# Patient Record
Sex: FEMALE | HISPANIC OR LATINO | Employment: OTHER | URBAN - METROPOLITAN AREA
[De-identification: names, ages, dates, MRNs, and addresses within clinical notes are randomized per-mention and may not be internally consistent; named-entity substitution may affect disease eponyms.]

---

## 2024-05-10 ENCOUNTER — PRE-ADMISSION TESTING (OUTPATIENT)
Dept: PREADMISSION TESTING | Facility: HOSPITAL | Age: 84
End: 2024-05-10
Payer: MEDICARE

## 2024-05-10 VITALS — WEIGHT: 123.02 LBS | BODY MASS INDEX: 22.64 KG/M2 | HEIGHT: 62 IN

## 2024-05-10 LAB
ALBUMIN SERPL-MCNC: 4.2 G/DL (ref 3.5–5.2)
ALBUMIN/GLOB SERPL: 1.3 G/DL
ALP SERPL-CCNC: 65 U/L (ref 39–117)
ALT SERPL W P-5'-P-CCNC: 19 U/L (ref 1–33)
ANION GAP SERPL CALCULATED.3IONS-SCNC: 10 MMOL/L (ref 5–15)
APTT PPP: 30.4 SECONDS (ref 22–39)
AST SERPL-CCNC: 20 U/L (ref 1–32)
BASOPHILS # BLD AUTO: 0.03 10*3/MM3 (ref 0–0.2)
BASOPHILS NFR BLD AUTO: 0.4 % (ref 0–1.5)
BILIRUB SERPL-MCNC: 0.3 MG/DL (ref 0–1.2)
BUN SERPL-MCNC: 29 MG/DL (ref 8–23)
BUN/CREAT SERPL: 25.7 (ref 7–25)
CALCIUM SPEC-SCNC: 9.5 MG/DL (ref 8.6–10.5)
CHLORIDE SERPL-SCNC: 101 MMOL/L (ref 98–107)
CO2 SERPL-SCNC: 30 MMOL/L (ref 22–29)
CREAT SERPL-MCNC: 1.13 MG/DL (ref 0.57–1)
DEPRECATED RDW RBC AUTO: 43.8 FL (ref 37–54)
EGFRCR SERPLBLD CKD-EPI 2021: 48.7 ML/MIN/1.73
EOSINOPHIL # BLD AUTO: 0.08 10*3/MM3 (ref 0–0.4)
EOSINOPHIL NFR BLD AUTO: 1.1 % (ref 0.3–6.2)
ERYTHROCYTE [DISTWIDTH] IN BLOOD BY AUTOMATED COUNT: 13.2 % (ref 12.3–15.4)
GLOBULIN UR ELPH-MCNC: 3.3 GM/DL
GLUCOSE SERPL-MCNC: 89 MG/DL (ref 65–99)
HBA1C MFR BLD: 5.4 % (ref 4.8–5.6)
HCT VFR BLD AUTO: 38.2 % (ref 34–46.6)
HGB BLD-MCNC: 12.6 G/DL (ref 12–15.9)
IMM GRANULOCYTES # BLD AUTO: 0.05 10*3/MM3 (ref 0–0.05)
IMM GRANULOCYTES NFR BLD AUTO: 0.7 % (ref 0–0.5)
INR PPP: 1.04 (ref 0.89–1.12)
LYMPHOCYTES # BLD AUTO: 1.92 10*3/MM3 (ref 0.7–3.1)
LYMPHOCYTES NFR BLD AUTO: 27.4 % (ref 19.6–45.3)
MCH RBC QN AUTO: 29.9 PG (ref 26.6–33)
MCHC RBC AUTO-ENTMCNC: 33 G/DL (ref 31.5–35.7)
MCV RBC AUTO: 90.7 FL (ref 79–97)
MONOCYTES # BLD AUTO: 0.41 10*3/MM3 (ref 0.1–0.9)
MONOCYTES NFR BLD AUTO: 5.8 % (ref 5–12)
NEUTROPHILS NFR BLD AUTO: 4.53 10*3/MM3 (ref 1.7–7)
NEUTROPHILS NFR BLD AUTO: 64.6 % (ref 42.7–76)
NRBC BLD AUTO-RTO: 0 /100 WBC (ref 0–0.2)
PLATELET # BLD AUTO: 250 10*3/MM3 (ref 140–450)
PMV BLD AUTO: 9.9 FL (ref 6–12)
POTASSIUM SERPL-SCNC: 4.1 MMOL/L (ref 3.5–5.2)
PROT SERPL-MCNC: 7.5 G/DL (ref 6–8.5)
PROTHROMBIN TIME: 13.7 SECONDS (ref 12.2–14.5)
RBC # BLD AUTO: 4.21 10*6/MM3 (ref 3.77–5.28)
SODIUM SERPL-SCNC: 141 MMOL/L (ref 136–145)
WBC NRBC COR # BLD AUTO: 7.02 10*3/MM3 (ref 3.4–10.8)

## 2024-05-10 PROCEDURE — 36415 COLL VENOUS BLD VENIPUNCTURE: CPT

## 2024-05-10 PROCEDURE — 80053 COMPREHEN METABOLIC PANEL: CPT

## 2024-05-10 PROCEDURE — 83036 HEMOGLOBIN GLYCOSYLATED A1C: CPT

## 2024-05-10 PROCEDURE — 85730 THROMBOPLASTIN TIME PARTIAL: CPT

## 2024-05-10 PROCEDURE — 93005 ELECTROCARDIOGRAM TRACING: CPT

## 2024-05-10 PROCEDURE — 85025 COMPLETE CBC W/AUTO DIFF WBC: CPT

## 2024-05-10 PROCEDURE — 85610 PROTHROMBIN TIME: CPT

## 2024-05-10 RX ORDER — OMEPRAZOLE 20 MG/1
20 CAPSULE, DELAYED RELEASE ORAL DAILY
COMMUNITY

## 2024-05-10 RX ORDER — IBUPROFEN 200 MG
200 TABLET ORAL DAILY
COMMUNITY

## 2024-05-10 RX ORDER — ACETAMINOPHEN 500 MG
500 TABLET ORAL DAILY
COMMUNITY

## 2024-05-10 RX ORDER — FOSINOPRIL SODIUM 20 MG/1
20 TABLET ORAL DAILY
COMMUNITY

## 2024-05-10 RX ORDER — HYDROCHLOROTHIAZIDE 25 MG/1
25 TABLET ORAL DAILY
COMMUNITY

## 2024-05-10 NOTE — PAT
Per Anesthesia Request, patient instructed not to take their ACE/ARB medications on the AM of surgery.     Patient's surgeon called in a prescription for Benzol Peroxide 5% wash to [facility].  Patient instructed to  from [facility] pharmacy that was submitted electronically.  Verbal and written instructions given regarding proper use of the Benzoyl Peroxide wash were provided to patient and/or famlily during PAT visit. Patient/family also instructed to complete Benzol Peroxide checklist and return it to Pre-op on the day of surgery.  Patient and/or family verbalized understanding.      Additionally, reinforced with patient to acquire this prescription from the [facility] retail pharmacy before leaving the hospital after PAT visit due to the potential unavailability at local pharmacies.     Bactroban prescribed by physician before PAT visit.  Verified with patient that medication(s) were picked up from their pharmacy.  Written instructions given to patient during PAT visit.  Patient/family also instructed to complete skin prep checklist and return the checklist on the day of surgery to preoperative staff.  Patient/family verbalized understanding.     Patient instructed to drink 20 ounces of Gatorade or Gatorlyte (if diabetic) and it needs to be completed 1 hour (for Main OR patients) or 2 hours (scheduled  section & [facility] patients) before given arrival time for procedure (NO RED Gatorade and NO Gatorade Zero).    Patient verbalized understanding.     Patient viewed general PAT education video as instructed in their preoperative information received from their surgeon.  Patient stated the general PAT education video was viewed in its entirety and survey completed.  Copies of PAT general education handouts (Incentive Spirometry, Meds to Beds Program, Patient Belongings, Pre-op skin preparation instructions, Blood Glucose testing, Visitor policy, Surgery FAQ, Code H) distributed to patient if not printed.  Education related to the PAT pass and skin preparation for surgery (if applicable) completed in PAT as a reinforcement to PAT education video. Patient instructed to return PAT pass provided today as well as completed skin preparation sheet (if applicable) on the day of procedure.     Additionally if patient had not viewed video yet but intended to view it at home or in our waiting area, then referred them to the handout with QR code/link provided during PAT visit.  Instructed patient to complete survey after viewing the video in its entirety.  Encouraged patient/family to read PAT general education handouts thoroughly and notify PAT staff with any questions or concerns. Patient verbalized understanding of all information and priority content.

## 2024-05-12 LAB
QT INTERVAL: 448 MS
QTC INTERVAL: 432 MS

## 2024-05-23 ENCOUNTER — ANESTHESIA EVENT (OUTPATIENT)
Dept: PERIOP | Facility: HOSPITAL | Age: 84
End: 2024-05-23
Payer: MEDICARE

## 2024-05-23 RX ORDER — SODIUM CHLORIDE 0.9 % (FLUSH) 0.9 %
10 SYRINGE (ML) INJECTION AS NEEDED
Status: CANCELLED | OUTPATIENT
Start: 2024-05-23

## 2024-05-23 RX ORDER — FAMOTIDINE 10 MG/ML
20 INJECTION, SOLUTION INTRAVENOUS ONCE
Status: CANCELLED | OUTPATIENT
Start: 2024-05-23 | End: 2024-05-23

## 2024-05-23 RX ORDER — SODIUM CHLORIDE 9 MG/ML
40 INJECTION, SOLUTION INTRAVENOUS AS NEEDED
Status: CANCELLED | OUTPATIENT
Start: 2024-05-23

## 2024-05-24 ENCOUNTER — ANESTHESIA EVENT CONVERTED (OUTPATIENT)
Dept: ANESTHESIOLOGY | Facility: HOSPITAL | Age: 84
End: 2024-05-24
Payer: MEDICARE

## 2024-05-24 ENCOUNTER — HOSPITAL ENCOUNTER (OUTPATIENT)
Facility: HOSPITAL | Age: 84
Discharge: HOME | End: 2024-05-25
Attending: ORTHOPAEDIC SURGERY | Admitting: ORTHOPAEDIC SURGERY
Payer: MEDICARE

## 2024-05-24 ENCOUNTER — ANESTHESIA (OUTPATIENT)
Dept: PERIOP | Facility: HOSPITAL | Age: 84
End: 2024-05-24
Payer: MEDICARE

## 2024-05-24 ENCOUNTER — APPOINTMENT (OUTPATIENT)
Dept: GENERAL RADIOLOGY | Facility: HOSPITAL | Age: 84
End: 2024-05-24
Payer: MEDICARE

## 2024-05-24 DIAGNOSIS — Z96.611 STATUS POST REVERSE TOTAL REPLACEMENT OF RIGHT SHOULDER: Primary | ICD-10-CM

## 2024-05-24 PROBLEM — M12.811 ROTATOR CUFF ARTHROPATHY OF RIGHT SHOULDER: Status: ACTIVE | Noted: 2024-05-24

## 2024-05-24 PROBLEM — M75.101 ROTATOR CUFF TEAR ARTHROPATHY OF RIGHT SHOULDER: Status: ACTIVE | Noted: 2024-05-24

## 2024-05-24 PROBLEM — M12.811 ROTATOR CUFF TEAR ARTHROPATHY OF RIGHT SHOULDER: Status: ACTIVE | Noted: 2024-05-24

## 2024-05-24 LAB — POTASSIUM SERPL-SCNC: 3.5 MMOL/L (ref 3.5–5.2)

## 2024-05-24 PROCEDURE — 25010000002 ONDANSETRON PER 1 MG

## 2024-05-24 PROCEDURE — 25010000002 CEFAZOLIN PER 500 MG: Performed by: ORTHOPAEDIC SURGERY

## 2024-05-24 PROCEDURE — 63710000001 ACETAMINOPHEN EXTRA STRENGTH 500 MG TABLET: Performed by: INTERNAL MEDICINE

## 2024-05-24 PROCEDURE — 25010000002 ROPIVACAINE HCL-NACL 0.2-0.9 % SOLUTION: Performed by: ORTHOPAEDIC SURGERY

## 2024-05-24 PROCEDURE — 97530 THERAPEUTIC ACTIVITIES: CPT

## 2024-05-24 PROCEDURE — 84132 ASSAY OF SERUM POTASSIUM: CPT | Performed by: ANESTHESIOLOGY

## 2024-05-24 PROCEDURE — 25810000003 SODIUM CHLORIDE 0.9 % SOLUTION: Performed by: ORTHOPAEDIC SURGERY

## 2024-05-24 PROCEDURE — 73020 X-RAY EXAM OF SHOULDER: CPT

## 2024-05-24 PROCEDURE — 25810000003 SODIUM CHLORIDE 0.9 % SOLUTION 250 ML FLEX CONT

## 2024-05-24 PROCEDURE — 25010000002 VANCOMYCIN 1 G RECONSTITUTED SOLUTION: Performed by: ORTHOPAEDIC SURGERY

## 2024-05-24 PROCEDURE — 25010000002 PROPOFOL 10 MG/ML EMULSION

## 2024-05-24 PROCEDURE — A9270 NON-COVERED ITEM OR SERVICE: HCPCS | Performed by: INTERNAL MEDICINE

## 2024-05-24 PROCEDURE — 25010000002 PHENYLEPHRINE 10 MG/ML SOLUTION

## 2024-05-24 PROCEDURE — G0378 HOSPITAL OBSERVATION PER HR: HCPCS

## 2024-05-24 PROCEDURE — 25010000002 FENTANYL CITRATE (PF) 50 MCG/ML SOLUTION: Performed by: NURSE ANESTHETIST, CERTIFIED REGISTERED

## 2024-05-24 PROCEDURE — 25010000002 BUPIVACAINE (PF) 0.25 % SOLUTION: Performed by: NURSE ANESTHETIST, CERTIFIED REGISTERED

## 2024-05-24 PROCEDURE — 25010000002 SUGAMMADEX 500 MG/5ML SOLUTION

## 2024-05-24 PROCEDURE — 25010000002 DEXAMETHASONE PER 1 MG

## 2024-05-24 PROCEDURE — C1776 JOINT DEVICE (IMPLANTABLE): HCPCS | Performed by: ORTHOPAEDIC SURGERY

## 2024-05-24 PROCEDURE — L3670 SO ACRO/CLAV CAN WEB PRE OTS: HCPCS | Performed by: ORTHOPAEDIC SURGERY

## 2024-05-24 PROCEDURE — 97550 CAREGIVER TRAING 1ST 30 MIN: CPT

## 2024-05-24 PROCEDURE — 63710000001 LISINOPRIL 20 MG TABLET: Performed by: INTERNAL MEDICINE

## 2024-05-24 PROCEDURE — 97166 OT EVAL MOD COMPLEX 45 MIN: CPT

## 2024-05-24 PROCEDURE — 25010000002 ROPIVACAINE HCL-NACL 0.2-0.9 % SOLUTION: Performed by: NURSE ANESTHETIST, CERTIFIED REGISTERED

## 2024-05-24 PROCEDURE — 25010000002 PHENYLEPHRINE 10 MG/ML SOLUTION 1 ML VIAL

## 2024-05-24 DEVICE — SCRW AEQUALIS PERFORM PERIPH 5X30MM: Type: IMPLANTABLE DEVICE | Site: SHOULDER | Status: FUNCTIONAL

## 2024-05-24 DEVICE — POST SHDLR AEQUALIS PERFORM REV PRSS/FIT SHT 7MM: Type: IMPLANTABLE DEVICE | Site: SHOULDER | Status: FUNCTIONAL

## 2024-05-24 DEVICE — ABSORBABLE HEMOSTAT (OXIDIZED REGENERATED CELLULOSE)
Type: IMPLANTABLE DEVICE | Site: SHOULDER | Status: FUNCTIONAL
Brand: SURGICEL

## 2024-05-24 DEVICE — SCRW PERIPH 5X34MM: Type: IMPLANTABLE DEVICE | Site: SHOULDER | Status: FUNCTIONAL

## 2024-05-24 DEVICE — SCRW AEQUALIS PERFORM PERIPH 5X26MM: Type: IMPLANTABLE DEVICE | Site: SHOULDER | Status: FUNCTIONAL

## 2024-05-24 DEVICE — IMPLANTABLE DEVICE: Type: IMPLANTABLE DEVICE | Site: SHOULDER | Status: FUNCTIONAL

## 2024-05-24 DEVICE — BASEPLT SHLDR AEQUALIS 1/2 WEDGE AUG 35D 25MM: Type: IMPLANTABLE DEVICE | Site: SHOULDER | Status: FUNCTIONAL

## 2024-05-24 RX ORDER — PREGABALIN 75 MG/1
75 CAPSULE ORAL ONCE
Status: COMPLETED | OUTPATIENT
Start: 2024-05-24 | End: 2024-05-24

## 2024-05-24 RX ORDER — TRANEXAMIC ACID 10 MG/ML
1000 INJECTION, SOLUTION INTRAVENOUS ONCE
Status: COMPLETED | OUTPATIENT
Start: 2024-05-24 | End: 2024-05-24

## 2024-05-24 RX ORDER — NALOXONE HCL 0.4 MG/ML
0.1 VIAL (ML) INJECTION
Status: DISCONTINUED | OUTPATIENT
Start: 2024-05-24 | End: 2024-05-25 | Stop reason: HOSPADM

## 2024-05-24 RX ORDER — ACETAMINOPHEN 500 MG
1000 TABLET ORAL ONCE
Status: COMPLETED | OUTPATIENT
Start: 2024-05-24 | End: 2024-05-24

## 2024-05-24 RX ORDER — FENTANYL CITRATE 50 UG/ML
50 INJECTION, SOLUTION INTRAMUSCULAR; INTRAVENOUS
Status: DISCONTINUED | OUTPATIENT
Start: 2024-05-24 | End: 2024-05-24 | Stop reason: HOSPADM

## 2024-05-24 RX ORDER — HYDRALAZINE HYDROCHLORIDE 20 MG/ML
5 INJECTION INTRAMUSCULAR; INTRAVENOUS
Status: DISCONTINUED | OUTPATIENT
Start: 2024-05-24 | End: 2024-05-24 | Stop reason: HOSPADM

## 2024-05-24 RX ORDER — LIDOCAINE HYDROCHLORIDE 10 MG/ML
0.5 INJECTION, SOLUTION EPIDURAL; INFILTRATION; INTRACAUDAL; PERINEURAL ONCE AS NEEDED
Status: COMPLETED | OUTPATIENT
Start: 2024-05-24 | End: 2024-05-24

## 2024-05-24 RX ORDER — IPRATROPIUM BROMIDE AND ALBUTEROL SULFATE 2.5; .5 MG/3ML; MG/3ML
3 SOLUTION RESPIRATORY (INHALATION) ONCE AS NEEDED
Status: DISCONTINUED | OUTPATIENT
Start: 2024-05-24 | End: 2024-05-24 | Stop reason: HOSPADM

## 2024-05-24 RX ORDER — DEXAMETHASONE SODIUM PHOSPHATE 4 MG/ML
INJECTION, SOLUTION INTRA-ARTICULAR; INTRALESIONAL; INTRAMUSCULAR; INTRAVENOUS; SOFT TISSUE AS NEEDED
Status: DISCONTINUED | OUTPATIENT
Start: 2024-05-24 | End: 2024-05-24 | Stop reason: SURG

## 2024-05-24 RX ORDER — MIDAZOLAM HYDROCHLORIDE 1 MG/ML
0.5 INJECTION INTRAMUSCULAR; INTRAVENOUS
Status: DISCONTINUED | OUTPATIENT
Start: 2024-05-24 | End: 2024-05-24 | Stop reason: HOSPADM

## 2024-05-24 RX ORDER — SODIUM CHLORIDE, SODIUM LACTATE, POTASSIUM CHLORIDE, CALCIUM CHLORIDE 600; 310; 30; 20 MG/100ML; MG/100ML; MG/100ML; MG/100ML
9 INJECTION, SOLUTION INTRAVENOUS CONTINUOUS
Status: DISCONTINUED | OUTPATIENT
Start: 2024-05-24 | End: 2024-05-24

## 2024-05-24 RX ORDER — DROPERIDOL 2.5 MG/ML
0.62 INJECTION, SOLUTION INTRAMUSCULAR; INTRAVENOUS ONCE AS NEEDED
Status: DISCONTINUED | OUTPATIENT
Start: 2024-05-24 | End: 2024-05-24 | Stop reason: HOSPADM

## 2024-05-24 RX ORDER — SODIUM CHLORIDE 9 MG/ML
9 INJECTION, SOLUTION INTRAVENOUS CONTINUOUS PRN
Status: DISCONTINUED | OUTPATIENT
Start: 2024-05-24 | End: 2024-05-24

## 2024-05-24 RX ORDER — PHENYLEPHRINE HYDROCHLORIDE 10 MG/ML
INJECTION INTRAVENOUS AS NEEDED
Status: DISCONTINUED | OUTPATIENT
Start: 2024-05-24 | End: 2024-05-24 | Stop reason: SURG

## 2024-05-24 RX ORDER — ACETAMINOPHEN 500 MG
500 TABLET ORAL EVERY 24 HOURS
Status: DISCONTINUED | OUTPATIENT
Start: 2024-05-24 | End: 2024-05-25 | Stop reason: HOSPADM

## 2024-05-24 RX ORDER — BUPIVACAINE HYDROCHLORIDE 2.5 MG/ML
INJECTION, SOLUTION EPIDURAL; INFILTRATION; INTRACAUDAL
Status: COMPLETED | OUTPATIENT
Start: 2024-05-24 | End: 2024-05-24

## 2024-05-24 RX ORDER — ONDANSETRON 2 MG/ML
4 INJECTION INTRAMUSCULAR; INTRAVENOUS ONCE AS NEEDED
Status: DISCONTINUED | OUTPATIENT
Start: 2024-05-24 | End: 2024-05-24 | Stop reason: HOSPADM

## 2024-05-24 RX ORDER — LABETALOL HYDROCHLORIDE 5 MG/ML
5 INJECTION, SOLUTION INTRAVENOUS
Status: DISCONTINUED | OUTPATIENT
Start: 2024-05-24 | End: 2024-05-24 | Stop reason: HOSPADM

## 2024-05-24 RX ORDER — FAMOTIDINE 20 MG/1
20 TABLET, FILM COATED ORAL ONCE
Status: COMPLETED | OUTPATIENT
Start: 2024-05-24 | End: 2024-05-24

## 2024-05-24 RX ORDER — DROPERIDOL 2.5 MG/ML
0.62 INJECTION, SOLUTION INTRAMUSCULAR; INTRAVENOUS
Status: DISCONTINUED | OUTPATIENT
Start: 2024-05-24 | End: 2024-05-24 | Stop reason: HOSPADM

## 2024-05-24 RX ORDER — ONDANSETRON 4 MG/1
4 TABLET, ORALLY DISINTEGRATING ORAL EVERY 6 HOURS PRN
Status: DISCONTINUED | OUTPATIENT
Start: 2024-05-24 | End: 2024-05-25 | Stop reason: HOSPADM

## 2024-05-24 RX ORDER — LIDOCAINE HYDROCHLORIDE 10 MG/ML
INJECTION, SOLUTION EPIDURAL; INFILTRATION; INTRACAUDAL; PERINEURAL AS NEEDED
Status: DISCONTINUED | OUTPATIENT
Start: 2024-05-24 | End: 2024-05-24 | Stop reason: SURG

## 2024-05-24 RX ORDER — SODIUM CHLORIDE 0.9 % (FLUSH) 0.9 %
10 SYRINGE (ML) INJECTION EVERY 12 HOURS SCHEDULED
Status: DISCONTINUED | OUTPATIENT
Start: 2024-05-24 | End: 2024-05-24 | Stop reason: HOSPADM

## 2024-05-24 RX ORDER — SODIUM CHLORIDE 0.9 % (FLUSH) 0.9 %
3-10 SYRINGE (ML) INJECTION AS NEEDED
Status: DISCONTINUED | OUTPATIENT
Start: 2024-05-24 | End: 2024-05-24 | Stop reason: HOSPADM

## 2024-05-24 RX ORDER — SODIUM CHLORIDE 450 MG/100ML
50 INJECTION, SOLUTION INTRAVENOUS CONTINUOUS
Status: DISCONTINUED | OUTPATIENT
Start: 2024-05-24 | End: 2024-05-25 | Stop reason: HOSPADM

## 2024-05-24 RX ORDER — LISINOPRIL 20 MG/1
20 TABLET ORAL
Status: DISCONTINUED | OUTPATIENT
Start: 2024-05-24 | End: 2024-05-25 | Stop reason: HOSPADM

## 2024-05-24 RX ORDER — OXYCODONE HYDROCHLORIDE 5 MG/1
5 TABLET ORAL EVERY 4 HOURS PRN
Status: DISCONTINUED | OUTPATIENT
Start: 2024-05-24 | End: 2024-05-25 | Stop reason: HOSPADM

## 2024-05-24 RX ORDER — HYDROMORPHONE HYDROCHLORIDE 1 MG/ML
0.5 INJECTION, SOLUTION INTRAMUSCULAR; INTRAVENOUS; SUBCUTANEOUS
Status: DISCONTINUED | OUTPATIENT
Start: 2024-05-24 | End: 2024-05-24 | Stop reason: HOSPADM

## 2024-05-24 RX ORDER — PROPOFOL 10 MG/ML
VIAL (ML) INTRAVENOUS AS NEEDED
Status: DISCONTINUED | OUTPATIENT
Start: 2024-05-24 | End: 2024-05-24 | Stop reason: SURG

## 2024-05-24 RX ORDER — PANTOPRAZOLE SODIUM 40 MG/1
40 TABLET, DELAYED RELEASE ORAL
Status: DISCONTINUED | OUTPATIENT
Start: 2024-05-25 | End: 2024-05-25 | Stop reason: HOSPADM

## 2024-05-24 RX ORDER — VANCOMYCIN HYDROCHLORIDE 1 G/20ML
INJECTION, POWDER, LYOPHILIZED, FOR SOLUTION INTRAVENOUS AS NEEDED
Status: DISCONTINUED | OUTPATIENT
Start: 2024-05-24 | End: 2024-05-24 | Stop reason: HOSPADM

## 2024-05-24 RX ORDER — HYDROCODONE BITARTRATE AND ACETAMINOPHEN 5; 325 MG/1; MG/1
1 TABLET ORAL ONCE AS NEEDED
Status: DISCONTINUED | OUTPATIENT
Start: 2024-05-24 | End: 2024-05-24 | Stop reason: HOSPADM

## 2024-05-24 RX ORDER — ONDANSETRON 2 MG/ML
4 INJECTION INTRAMUSCULAR; INTRAVENOUS EVERY 6 HOURS PRN
Status: DISCONTINUED | OUTPATIENT
Start: 2024-05-24 | End: 2024-05-25 | Stop reason: HOSPADM

## 2024-05-24 RX ORDER — EPHEDRINE SULFATE 50 MG/ML
INJECTION INTRAVENOUS AS NEEDED
Status: DISCONTINUED | OUTPATIENT
Start: 2024-05-24 | End: 2024-05-24 | Stop reason: SURG

## 2024-05-24 RX ORDER — ONDANSETRON 2 MG/ML
INJECTION INTRAMUSCULAR; INTRAVENOUS AS NEEDED
Status: DISCONTINUED | OUTPATIENT
Start: 2024-05-24 | End: 2024-05-24 | Stop reason: SURG

## 2024-05-24 RX ORDER — ACETAMINOPHEN 325 MG/1
650 TABLET ORAL EVERY 4 HOURS PRN
Status: DISCONTINUED | OUTPATIENT
Start: 2024-05-24 | End: 2024-05-25 | Stop reason: HOSPADM

## 2024-05-24 RX ORDER — ROCURONIUM BROMIDE 10 MG/ML
INJECTION, SOLUTION INTRAVENOUS AS NEEDED
Status: DISCONTINUED | OUTPATIENT
Start: 2024-05-24 | End: 2024-05-24 | Stop reason: SURG

## 2024-05-24 RX ORDER — SODIUM CHLORIDE 0.9 % (FLUSH) 0.9 %
3 SYRINGE (ML) INJECTION EVERY 12 HOURS SCHEDULED
Status: DISCONTINUED | OUTPATIENT
Start: 2024-05-24 | End: 2024-05-24 | Stop reason: HOSPADM

## 2024-05-24 RX ORDER — TRANEXAMIC ACID 10 MG/ML
1000 INJECTION, SOLUTION INTRAVENOUS ONCE
Status: DISCONTINUED | OUTPATIENT
Start: 2024-05-24 | End: 2024-05-24 | Stop reason: HOSPADM

## 2024-05-24 RX ORDER — ROPIVACAINE HYDROCHLORIDE 2 MG/ML
INJECTION, SOLUTION EPIDURAL; INFILTRATION; PERINEURAL CONTINUOUS
Status: DISCONTINUED | OUTPATIENT
Start: 2024-05-24 | End: 2024-05-25 | Stop reason: HOSPADM

## 2024-05-24 RX ORDER — SODIUM CHLORIDE 9 MG/ML
40 INJECTION, SOLUTION INTRAVENOUS AS NEEDED
Status: DISCONTINUED | OUTPATIENT
Start: 2024-05-24 | End: 2024-05-24 | Stop reason: HOSPADM

## 2024-05-24 RX ORDER — FENTANYL CITRATE 50 UG/ML
INJECTION, SOLUTION INTRAMUSCULAR; INTRAVENOUS
Status: COMPLETED | OUTPATIENT
Start: 2024-05-24 | End: 2024-05-24

## 2024-05-24 RX ADMIN — TRANEXAMIC ACID 1000 MG: 10 INJECTION, SOLUTION INTRAVENOUS at 11:03

## 2024-05-24 RX ADMIN — PHENYLEPHRINE HYDROCHLORIDE 100 MCG: 10 INJECTION INTRAVENOUS at 10:58

## 2024-05-24 RX ADMIN — EPHEDRINE SULFATE 5 MG: 50 INJECTION INTRAVENOUS at 11:30

## 2024-05-24 RX ADMIN — EPHEDRINE SULFATE 5 MG: 50 INJECTION INTRAVENOUS at 11:49

## 2024-05-24 RX ADMIN — ROCURONIUM BROMIDE 50 MG: 10 INJECTION INTRAVENOUS at 10:49

## 2024-05-24 RX ADMIN — SODIUM CHLORIDE 2000 MG: 900 INJECTION INTRAVENOUS at 18:06

## 2024-05-24 RX ADMIN — FENTANYL CITRATE 50 MCG: 50 INJECTION, SOLUTION INTRAMUSCULAR; INTRAVENOUS at 09:35

## 2024-05-24 RX ADMIN — EPHEDRINE SULFATE 10 MG: 50 INJECTION INTRAVENOUS at 10:58

## 2024-05-24 RX ADMIN — LISINOPRIL 20 MG: 20 TABLET ORAL at 18:06

## 2024-05-24 RX ADMIN — BUPIVACAINE HYDROCHLORIDE 15 ML: 2.5 INJECTION, SOLUTION EPIDURAL; INFILTRATION; INTRACAUDAL at 09:35

## 2024-05-24 RX ADMIN — LIDOCAINE HYDROCHLORIDE 50 MG: 10 INJECTION, SOLUTION EPIDURAL; INFILTRATION; INTRACAUDAL; PERINEURAL at 10:49

## 2024-05-24 RX ADMIN — PHENYLEPHRINE HYDROCHLORIDE 20 MCG/KG/MIN: 10 INJECTION INTRAVENOUS at 11:09

## 2024-05-24 RX ADMIN — ONDANSETRON 4 MG: 2 INJECTION INTRAMUSCULAR; INTRAVENOUS at 12:03

## 2024-05-24 RX ADMIN — ACETAMINOPHEN 1000 MG: 500 TABLET ORAL at 09:01

## 2024-05-24 RX ADMIN — ROCURONIUM BROMIDE 15 MG: 10 INJECTION INTRAVENOUS at 11:25

## 2024-05-24 RX ADMIN — DEXAMETHASONE SODIUM PHOSPHATE 4 MG: 4 INJECTION INTRA-ARTICULAR; INTRALESIONAL; INTRAMUSCULAR; INTRAVENOUS; SOFT TISSUE at 10:56

## 2024-05-24 RX ADMIN — BUPIVACAINE HYDROCHLORIDE 20 ML: 2.5 INJECTION, SOLUTION EPIDURAL; INFILTRATION; INTRACAUDAL; PERINEURAL at 09:43

## 2024-05-24 RX ADMIN — SODIUM CHLORIDE 9 ML/HR: 9 INJECTION, SOLUTION INTRAVENOUS at 09:01

## 2024-05-24 RX ADMIN — Medication 1000 MG: at 12:59

## 2024-05-24 RX ADMIN — PROPOFOL 150 MG: 10 INJECTION, EMULSION INTRAVENOUS at 10:49

## 2024-05-24 RX ADMIN — FAMOTIDINE 20 MG: 20 TABLET, FILM COATED ORAL at 09:01

## 2024-05-24 RX ADMIN — LIDOCAINE HYDROCHLORIDE 0.5 ML: 10 INJECTION, SOLUTION EPIDURAL; INFILTRATION; INTRACAUDAL; PERINEURAL at 09:01

## 2024-05-24 RX ADMIN — ACETAMINOPHEN 500 MG: 500 TABLET ORAL at 18:05

## 2024-05-24 RX ADMIN — SODIUM CHLORIDE 2000 MG: 900 INJECTION INTRAVENOUS at 10:56

## 2024-05-24 RX ADMIN — SUGAMMADEX 150 MG: 100 INJECTION, SOLUTION INTRAVENOUS at 12:16

## 2024-05-24 RX ADMIN — FENTANYL CITRATE 100 MCG: 50 INJECTION, SOLUTION INTRAMUSCULAR; INTRAVENOUS at 10:49

## 2024-05-24 RX ADMIN — PREGABALIN 75 MG: 75 CAPSULE ORAL at 09:01

## 2024-05-24 RX ADMIN — PROPOFOL 50 MG: 10 INJECTION, EMULSION INTRAVENOUS at 12:18

## 2024-05-24 RX ADMIN — TRANEXAMIC ACID 1000 MG: 10 INJECTION, SOLUTION INTRAVENOUS at 12:02

## 2024-05-24 NOTE — ANESTHESIA PROCEDURE NOTES
Airway  Urgency: elective    Date/Time: 5/24/2024 10:52 AM  Airway not difficult    General Information and Staff    Patient location during procedure: OR  CRNA/CAA: Luciano Peres CRNA    Indications and Patient Condition  Indications for airway management: airway protection    Preoxygenated: yes  MILS not maintained throughout  Mask difficulty assessment: 2 - vent by mask + OA or adjuvant +/- NMBA    Final Airway Details  Final airway type: endotracheal airway      Successful airway: ETT  Cuffed: yes   Successful intubation technique: video laryngoscopy  Endotracheal tube insertion site: oral  Blade: Michaels  Blade size: 3  ETT size (mm): 7.0  Cormack-Lehane Classification: grade I - full view of glottis  Placement verified by: chest auscultation and capnometry   Cuff volume (mL): 8  Measured from: lips  ETT/EBT  to lips (cm): 21  Number of attempts at approach: 1  Assessment: lips, teeth, and gum same as pre-op and atraumatic intubation    Additional Comments  Negative epigastric sounds, Breath sound equal bilaterally with symmetric chest rise and fall

## 2024-05-24 NOTE — ANESTHESIA PREPROCEDURE EVALUATION
Anesthesia Evaluation     Patient summary reviewed and Nursing notes reviewed   history of anesthetic complications:  PONV               Airway   Mallampati: II  TM distance: >3 FB  Neck ROM: full  No difficulty expected  Dental - normal exam     Pulmonary - negative pulmonary ROS and normal exam   Cardiovascular - normal exam    (+) hypertension      Neuro/Psych- negative ROS  GI/Hepatic/Renal/Endo    (+) GERD    Musculoskeletal (-) negative ROS    Abdominal  - normal exam    Bowel sounds: normal.   Substance History - negative use     OB/GYN negative ob/gyn ROS         Other                      Anesthesia Plan    ASA 2     general with block     intravenous induction     Anesthetic plan, risks, benefits, and alternatives have been provided, discussed and informed consent has been obtained with: patient.    Plan discussed with CRNA.    CODE STATUS:

## 2024-05-24 NOTE — ANESTHESIA PROCEDURE NOTES
Peripheral Block      Patient reassessed immediately prior to procedure    Patient location during procedure: OR  Reason for block: at surgeon's request and post-op pain management  Performed by  CRNA/CAA: Scott Collazo CRNA  Preanesthetic Checklist  Completed: patient identified, IV checked, site marked, risks and benefits discussed, surgical consent, monitors and equipment checked, pre-op evaluation and timeout performed  Prep:  Pt Position: supine  Sterile barriers:cap, gloves, mask and washed/disinfected hands  Prep: ChloraPrep  Patient monitoring: blood pressure monitoring, continuous pulse oximetry and EKG  Procedure  Performed under: general  Guidance:ultrasound guided and landmark technique  Images:still images obtained, printed/placed on chart    Laterality:right  Block Type:PECS I and PECS II  Injection Technique:single-shot  Needle Type:short-bevel  Needle Gauge:20 G  Resistance on Injection: none    Medications Used: bupivacaine PF (MARCAINE) 0.25 % injection - Injection   20 mL - 5/24/2024 9:43:00 AM      Medications  Preservative Free Saline:10ml  Comment:Block Injection:  Total volume of LA divided between Right and Left sided blocks         Post Assessment  Injection Assessment: negative aspiration for heme, incremental injection and no paresthesia on injection  Patient Tolerance:comfortable throughout block  Complications:no  Additional Notes  Interpectoral-Pectoserratus Plane   A high-frequency linear transducer, with sterile cover, was placed medial to the coracoid process in the paramedian sagittal plane. The transducer was moved caudally to the 4th rib and rotated slightly to allow an in-plane needle trajectory from medial to lateral. Pectoralis Major Muscle (PMM), Pectoralis Minor Muscle (PmM), Thoracoacromial Artery, Ribs, and Pleura were identified under ultrasound. The insertion site was prepped in sterile fashion and then localized with 2-5 ml of 1% Lidocaine. Using ultrasound-guidance, a  "20-gauge B-Ballard 4\" Ultraplex 360 non-stimulating echogenic needle was advanced in plane until the tip of the needle was in the fascial plane between the PMM and PmM, lateral to the Thoracoacromial Artery. 1-3ml of preservative free normal saline was used to hydro-dissect the fascial planes. After the fascial plane was verified, 10ml local anesthetic (LA) was injected for Interpectoral fascial plane block. The needle was continued along the same path to the level of the 4th rib below PmM.  Initially preservative free normal saline was used to confirm needle position and then 20 ml of LA was injected for Pectoserratus fascial plane block. Aspiration every 5 ml to prevent intravascular injection. Injection was completed with negative aspiration of blood and negative intravascular injection. Injection pressures were normal with minimal resistance.             "

## 2024-05-24 NOTE — PLAN OF CARE
Goal Outcome Evaluation:      Pain is well controlled at this time; nerve block in place. Up with assist of one and gait belt; loss of balance noted when ambulating; gait unsteady (PT to see patient again in the AM).  Patient remains on room air, vital signs stable. No complaints/concerns voiced by patient at this time--patient is hoping to go home tomorrow.

## 2024-05-24 NOTE — OP NOTE
Operative Report Reverse Total Shoulder Arthroplasty     DATE OF OPERATION: 05/24/24     PREOPERATIVE DIAGNOSIS: right shoulder rotator cuff tear arthropathy      POSTOPERATIVE DIAGNOSES:  1. same     PROCEDURES PERFORMED:  1. right reverse total shoulder arthroplasty.          SURGEON: Spike Mohr MD           Assistant: Aparna Colorado PA  ** Please note the physician assistant was medically necessary to assist with positioning retraction, arm positioning, care of soft tissues and closure      ANESTHESIA: General plus block.       ESTIMATED BLOOD LOSS:150mL.       COMPLICATIONS: None.       DISPOSITION: Recovery room in stable condition.      IMPLANTS:  Tornier reverse total shoulder system  Humeral Stem: size 1 + long  Humeral Tray: as above  Polyethylene Liner: 33 +0, std  Baseplate: 25 half-wedge,  short post  Glenosphere: 33+3      INDICATIONS: This is a 71-year-old female with right shoulder pain and limited function and motion secondary to above diagnosis. They have failed conservative treatment and after a discussion of risks, benefits, and alternatives, wished to proceed with shoulder arthroplasty.     DESCRIPTION OF PROCEDURE: On the day of surgery, the patient identified the right shoulder as the correct operative extremity. This was initialed by the surgeon with the patients's acknowledgment. The patient underwent placement of an interscalene block and was taken to the operating room and placed in the supine position. Upon induction of adequate anesthesia, the patient was brought up to the beach chair position and the shoulder and upper extremity were prepped and draped in the usual sterile fashion. Timeout confirmed the correct patient and operative extremity as well as that antibiotics were on board. A standard deltopectoral approach to the shoulder was carried out. It was carried sharply through the skin and subcutaneous tissue. Medial and lateral flaps were developed over the deltopectoral fascia.  The cephalic vein was identified and mobilized laterally with the deltoid. The subdeltoid and subpectoral spaces were mobilized and a blunt retractor was placed deep to this. The clavipectoral fascia was opened on the lateral edge of the conjoined tendon and the retractor was moved deep to this. The leading edge of the pectoralis was released exposing the long head of the biceps. This was tenosynovitic and therefore tenotomized. The 3 sisters were identified and coagulated. A subscapularis tenotomy was performed and rotator interval was released to the glenoid exposing the humeral head. The inferior capsule was released directly off the humerus to allow greater than 90° of external rotation. The anatomic neck was exposed and the humeral head osteotomy was performed in approximately 20° of retroversion. The remainder of the osteophytes were removed. The canal was then entered, reamed, and broached. The final stem impacted in in approximately 20° of retroversion. A head protector was placed. The humerus was subluxed posteriorly. The glenoid exposed. Circumferential labral excision and capsular release were performed. A  mobilization of the subscapularis was carried out as well.  A centering hole was drilled. The glenoid was gently reamed and then the  central hole for the baseplate was drilled  glenoid baseplate inserted.  Screws were then placed through the baseplate  The glenosphere was then inserted and locked into place with a set screw.  The humerus was carefully subluxed back anteriorly. A liner tray and polyethylene were placed and trialing was carried out. The appropriate final sizes were chosen and locked into place.  The shoulder was then reduced.  This allowed nearly full passive range of motion with no instability. The joint was copiously irrigated with orthopedic irrigation mixed with Betadine after the final implants were assembled and locked into place.      vancomycin powder was placed in the wound        The deltopectoral interval was approximated with 0 Vicryl, the subcutaneous tissue with 2-0 Vicryl, and the skin with nylon. A sterile dressing was placed. Anesthesia was reversed and the patient was taken to the recovery room in stable condition. All instrument, needle, and sponge counts were correct.       Spike Mohr MD, MS

## 2024-05-24 NOTE — H&P
Pre-Op H&P  Angela Rico  [id number]  1940      Chief complaint: Right shoulder pain      Subjective:  Patient is a 83 y.o.female presents for scheduled surgery by Dr. Mohr. She anticipates a REVERSE TOTAL SHOULDER ARTHROPLASTY - RIGHT today.  She reports a 30-year history of worsening right shoulder pain.  She has limited range of motion and occasionally difficulty with  of her right hand.  She denies numbness or tingling.  Conservative treatments failed to provide lasting benefits.      Review of Systems:  Constitutional-- No fever, chills or sweats. No fatigue.  CV-- No chest pain, palpitation or syncope. +HTN  Resp-- No SOB, cough, hemoptysis  Skin--No rashes or lesions      Allergies:   Allergies   Allergen Reactions    Meloxicam Rash    Penicillins Itching and Rash         Home Meds:  Medications Prior to Admission   Medication Sig Dispense Refill Last Dose    acetaminophen (TYLENOL) 500 MG tablet Take 1 tablet by mouth Daily. Taking 1-2 times a day   5/23/2024    benzoyl peroxide 5 % external wash Use as directed by Dr. Mohr 142 g 0 5/24/2024    fosinopril (MONOPRIL) 20 MG tablet Take 1 tablet by mouth Daily.   5/23/2024    hydroCHLOROthiazide 25 MG tablet Take 1 tablet by mouth Daily.   5/23/2024    mupirocin (BACTROBAN) 2 % ointment Apply 1 application in each nostril 2 times daily beginning 5 days before surgery. 22 g 0 5/23/2024    omeprazole (priLOSEC) 20 MG capsule Take 1 capsule by mouth Daily.   5/24/2024 at 0400    ibuprofen (ADVIL,MOTRIN) 200 MG tablet Take 1 tablet by mouth Daily.   5/13/2024    ondansetron (ZOFRAN) 4 MG tablet Take 1 tablet by mouth Every 8 (Eight) Hours As Needed for post-op nausea. 30 tablet 0          PMH:   Past Medical History:   Diagnosis Date    Arthritis     GERD (gastroesophageal reflux disease)     Hypertension     PONV (postoperative nausea and vomiting)      PSH:    Past Surgical History:   Procedure Laterality Date    CATARACT EXTRACTION W/  INTRAOCULAR LENS IMPLANT Bilateral     COLONOSCOPY      ENDOSCOPY      TUBAL ABDOMINAL LIGATION      WRIST SURGERY Left        Immunization History:  Influenza: UTD  Pneumococcal: UTD  Tetanus: UTD  Covid : x3    Social History:   Tobacco:   Social History     Tobacco Use   Smoking Status Never   Smokeless Tobacco Never      Alcohol:     Social History     Substance and Sexual Activity   Alcohol Use Never         Physical Exam:/81 (BP Location: Right arm, Patient Position: Lying)   Pulse 58   Temp 98.5 °F (36.9 °C)   Resp 18   SpO2 97%       General Appearance:    Alert, cooperative, no distress, appears stated age   Head:    Normocephalic, without obvious abnormality, atraumatic   Lungs:     Clear to auscultation bilaterally, respirations unlabored    Heart:   Regular rate and rhythm, S1 and S2 normal    Abdomen:    Soft without tenderness   Extremities:   Extremities normal, atraumatic, no cyanosis or edema   Skin:   Skin color, texture, turgor normal, no rashes or lesions   Neurologic:   Grossly intact     Results Review:     LABS:  Lab Results   Component Value Date    WBC 7.02 05/10/2024    HGB 12.6 05/10/2024    HCT 38.2 05/10/2024    MCV 90.7 05/10/2024     05/10/2024    NEUTROABS 4.53 05/10/2024    GLUCOSE 89 05/10/2024    BUN 29 (H) 05/10/2024    CREATININE 1.13 (H) 05/10/2024     05/10/2024    K 4.1 05/10/2024     05/10/2024    CO2 30.0 (H) 05/10/2024    CALCIUM 9.5 05/10/2024    ALBUMIN 4.2 05/10/2024    AST 20 05/10/2024    ALT 19 05/10/2024    BILITOT 0.3 05/10/2024       RADIOLOGY:  Imaging Results (Last 72 Hours)       ** No results found for the last 72 hours. **            I reviewed the patient's new clinical results.    Cancer Staging (if applicable)  Cancer Patient: __ yes __no __unknown; If yes, clinical stage T:__ N:__M:__, stage group or __N/A      Impression: Right shoulder pain      Plan: REVERSE TOTAL SHOULDER ARTHROPLASTY - RIGHT      TOMÁS Quezada    5/24/2024   09:06 EDT

## 2024-05-24 NOTE — ANESTHESIA POSTPROCEDURE EVALUATION
Patient: Angela Rico    Procedure Summary       Date: 05/24/24 Room / Location:  [facility] /  [facility]    Anesthesia Start: 1042 Anesthesia Stop: 1245    Procedure: REVERSE TOTAL SHOULDER ARTHROPLASTY - RIGHT (Right: Shoulder) Diagnosis:     Surgeons: Spike Mohr MD Provider: Ramon Bird MD    Anesthesia Type: general with block ASA Status: 2            Anesthesia Type: general with block    Vitals  Vitals Value Taken Time   /73 05/24/24 1245   Temp 97 °F (36.1 °C) 05/24/24 1245   Pulse 82 05/24/24 1245   Resp 14 05/24/24 1245   SpO2 99 % 05/24/24 1245           Post Anesthesia Care and Evaluation    Patient location during evaluation: PACU  Patient participation: complete - patient participated  Level of consciousness: awake and alert  Pain score: 0  Pain management: adequate    Airway patency: patent  Anesthetic complications: No anesthetic complications  PONV Status: none  Cardiovascular status: hemodynamically stable and acceptable  Respiratory status: nonlabored ventilation, acceptable and nasal cannula  Hydration status: acceptable    Comments: Pt arrived to PACU with no issues during transport. Pt placed directly to monitors. Vital signs are within parameters. Pt maintaining ventilation spontaneously. No changes to dental. Report given to PACU and all question and concerns were addressed as well as the plan of care.

## 2024-05-24 NOTE — ANESTHESIA PROCEDURE NOTES
Peripheral Block      Patient reassessed immediately prior to procedure    Patient location during procedure: pre-op  Reason for block: at surgeon's request and post-op pain management  Performed by  CRNA/CAA: Scott Collazo CRNA  Preanesthetic Checklist  Completed: patient identified, IV checked, site marked, risks and benefits discussed, surgical consent, monitors and equipment checked, pre-op evaluation and timeout performed  Prep:  Sterile barriers:cap, gloves, mask and washed/disinfected hands  Prep: ChloraPrep  Patient monitoring: blood pressure monitoring, continuous pulse oximetry and EKG  Procedure    Sedation: yes  Performed under: local infiltration  Guidance:ultrasound guided    ULTRASOUND INTERPRETATION.  Using ultrasound guidance a 20 G gauge needle was placed in close proximity to the nerve, at which point, under ultrasound guidance anesthetic was injected in the area of the nerve and spread of the anesthesia was seen on ultrasound in close proximity thereto.  There were no abnormalities seen on ultrasound; a digital image was taken; and the patient tolerated the procedure with no complications. Images:still images obtained, printed/placed on chart    Laterality:right  Block Type:interscalene  Injection Technique:catheter  Needle Type:Tuohy and echogenic  Needle Gauge:18 G  Resistance on Injection: none  Catheter Size:20 G (20g)  Cath Depth at skin: 8 cm    Medications Used: bupivacaine PF (MARCAINE) 0.25 % injection - Injection   15 mL - 5/24/2024 9:35:00 AM  fentaNYL citrate (PF) (SUBLIMAZE) injection - Intravenous   50 mcg - 5/24/2024 9:35:00 AM      Post Assessment  Injection Assessment: negative aspiration for heme, no paresthesia on injection and incremental injection  Patient Tolerance:comfortable throughout block  Complications:no  Additional Notes  CATHETER  A high-frequency linear transducer, with sterile cover, was placed in the supraclavicular fossa to identify the subclavian artery and  "trunks and divisions of the brachial plexus. The transducer was then moved in a cephalad orientation with a slight rotation to continue visualization of the brachial plexus from the trunks and divisions, on to the C5-C7 roots. The insertion site was prepped and draped in sterile fashion. Skin and cutaneous tissue was infiltrated with 2-5 ml of 1% Lidocaine. Using ultrasound-guidance, an 18-gauge Contiplex Ultra 360 Touhy needle was advanced in plane from lateral to medial. Preservative-free normal saline was utilized for hydro-dissection of tissue, advancement of Touhy, and to confirm final needle placement at the fascial plane between the middle scalene muscle and sheath of the brachial plexus (C5-C7). A 20-gauge Contiplex Echo catheter was placed through the needle and advance out the tip of the Touhy 3-5 cm with the \"Tirado Flip\". The Touhy needle was then removed, and final catheter position verified lateral to the brachial plexus with local anesthetic (LA) and ultrasound visualization. The catheter was secured in the usual fashion with skin glue, benzoin, steri-strips, CHG tegaderm and label noting \"Nerve Block Catheter\". Jerk tape applied at yellow connector and catheter connection. All LA was injected in increments of 3-5 ml after catheter secured. Aspiration every 5 ml to prevent intravascular injection. Injection was completed with negative aspiration of blood and negative intravascular injection. Injection pressures were normal with minimal resistance.           "

## 2024-05-24 NOTE — THERAPY EVALUATION
Patient Name: Angela Rico  : 1940    MRN: [id number]                              Today's Date: 2024       Admit Date: 2024    Visit Dx: No diagnosis found.  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     Past Medical History:   Diagnosis Date    Arthritis     GERD (gastroesophageal reflux disease)     Hypertension     PONV (postoperative nausea and vomiting)      Past Surgical History:   Procedure Laterality Date    CATARACT EXTRACTION W/ INTRAOCULAR LENS IMPLANT Bilateral     COLONOSCOPY      ENDOSCOPY      TUBAL ABDOMINAL LIGATION      WRIST SURGERY Left       General Information       Row Name 24 1138          OT Time and Intention    Document Type evaluation;therapy note (daily note);other (see comments)  Family Teaching  -TB     Mode of Treatment occupational therapy;individual therapy  -TB       Row Name 24 1138          General Information    Patient Profile Reviewed yes  -TB     Prior Level of Function independent:;all household mobility;community mobility;transfer;bed mobility;ADL's;home management  -TB     Existing Precautions/Restrictions right;shoulder;non-weight bearing;other (see comments);fall  s/p R rTSA with NWB precautions, Sling with abduction pillow, IS nerve catheter  -TB     Barriers to Rehab none identified  -TB       Row Name 24 1138          Occupational Profile    Reason for Services/Referral (Occupational Profile) Occupational decline  -TB     Environmental Supports and Barriers (Occupational Profile) Pt lives with a friend in a single story home with ramp to enter. Walk-in shower with seat. ETS. No AD or recent falls prior. Pt is independent with all self-care at baseline.  -TB       Row Name 24 1138          Living Environment    People in Home friend(s)  -TB       Row Name 24 1138          Home Main Entrance    Number of Stairs, Main  Entrance none;other (see comments)  Ramp  -TB       Row Name 05/24/24 1138          Stairs Within Home, Primary    Number of Stairs, Within Home, Primary none  -TB       Row Name 05/24/24 1138          Cognition    Orientation Status (Cognition) oriented x 4  -TB       Row Name 05/24/24 1138          Safety Issues, Functional Mobility    Safety Issues Affecting Function (Mobility) insight into deficits/self-awareness;awareness of need for assistance;safety precaution awareness;safety precautions follow-through/compliance;sequencing abilities  -TB     Impairments Affecting Function (Mobility) balance;pain;strength;sensation/sensory awareness;range of motion (ROM);postural/trunk control  -TB     Comment, Safety Issues/Impairments (Mobility) Pt up in room and transfering with Min Ax1. Presents with unsteady gait and posterior LOBx1. Pt failed the OT mobility screen. Recommend PT assessment.  -TB               User Key  (r) = Recorded By, (t) = Taken By, (c) = Cosigned By      Initials Name Provider Type     Nikole Flores, OT Occupational Therapist                     Mobility/ADL's       Row Name 05/24/24 1514          Bed Mobility    Bed Mobility supine-sit;scooting/bridging  -TB     Scooting/Bridging [place] (Bed Mobility) standby assist;verbal cues  -TB     Supine-Sit [place] (Bed Mobility) minimum assist (75% patient effort);verbal cues  -TB     Bed Mobility, Safety Issues decreased use of arms for pushing/pulling  -TB     Assistive Device (Bed Mobility) head of bed elevated;bed rails  -TB     Comment, (Bed Mobility) Education, cues, and assist for RUE NWB precautions  -TB       Row Name 05/24/24 1514          Transfers    Transfers sit-stand transfer;stand-sit transfer;bed-chair transfer  -TB     Comment, (Transfers) Education, cues, and assist for hand placement, sequencing, and safety. Unsteady with posterior LOB.  -TB       Row Name 05/24/24 1514          Bed-Chair Transfer    Bed-Chair  [place] (Transfers) minimum assist (75% patient effort);1 person assist;verbal cues  -TB     Comment, (Bed-Chair Transfer) LUE support  -TB       Row Name 05/24/24 1514          Sit-Stand Transfer    Sit-Stand [place] (Transfers) minimum assist (75% patient effort);1 person assist;verbal cues  -TB     Comment, (Sit-Stand Transfer) Pushing up from bed rail  -TB       Row Name 05/24/24 1514          Stand-Sit Transfer    Stand-Sit [place] (Transfers) minimum assist (75% patient effort);1 person assist;verbal cues  -TB     Comment, (Stand-Sit Transfer) Assist to lower with control  -TB       Row Name 05/24/24 1514          Functional Mobility    Functional Mobility- Ind. Level minimum assist (75% patient effort);1 person;verbal cues required  -TB     Functional Mobility-Maintain WBing cues to maintain weight bearing status  -TB     Functional Mobility- Safety Issues balance decreased during turns  -TB     Functional Mobility- Comment Pt up in room and transfering with Min Ax1. Presents with unsteady gait and posterior LOBx1. Pt failed the OT mobility screen. Recommend PT assessment.  -TB     Patient was able to Ambulate yes  -TB       Row Name 05/24/24 1514          Activities of Daily Living    BADL Assessment/Intervention bathing;upper body dressing;grooming;feeding  -TB       Row Name 05/24/24 1514          Mobility    Extremity Weight-bearing Status right upper extremity  -TB     Right Upper Extremity (Weight-bearing Status) non weight-bearing (NWB)   -       Row Name 05/24/24 1514          Bathing Assessment/Intervention    [place] Level (Bathing) upper body;bathing skills  -TB     Position (Bathing) edge of bed sitting  -TB     Comment, (Bathing) Patient educated on shoulder precautions with axilla care. Patient educated that nerve catheter cannot get wet.  -TB       Row Name 05/24/24 1514          Upper Body Dressing Assessment/Training    [place] Level (Upper Body Dressing) upper body  dressing skills  sling mgmt and adjustment for improved fit  -TB     Position (Upper Body Dressing) edge of bed sitting  -TB     Comment, (Upper Body Dressing) Patient educated on shoulder precautions with ADL retraining and sling management. Patient educated on nerve catheter care to avoid dislodgement.  -TB       Row Name 05/24/24 1514          Grooming Assessment/Training    [place] Level (Grooming) moderate assist (50% patient effort);hair care, combing/brushing  -TB     Position (Grooming) edge of bed sitting  -TB     Comment, (Grooming) Friend assisting  -TB       Row Name 05/24/24 1514          Self-Feeding Assessment/Training    [place] Level (Feeding) independent;liquids to mouth  -TB     Position (Self-Feeding) supported sitting  -TB               User Key  (r) = Recorded By, (t) = Taken By, (c) = Cosigned By      Initials Name Provider Type    TB Nikole Flores, OT Occupational Therapist                   Obj/Interventions       Row Name 05/24/24 1521          Sensory Assessment (Somatosensory)    Sensory Assessment (Somatosensory) right UE  -TB     Sensory Subjective Reports numbness;tingling  -TB     Sensory Assessment RUE IS nerve catheter infusing  -TB       Row Name 05/24/24 1521          Range of Motion Comprehensive    Comment, General Range of Motion LUE AROM WFL for self-care. R hand, wrist, and elbow WFL. R shoulder deferred.  -TB       Row Name 05/24/24 1521          Strength Comprehensive (MMT)    Comment, General Manual Muscle Testing (MMT) Assessment Generalized weakness  -TB       Row Name 05/24/24 1521          Elbow/Forearm (Therapeutic Exercise)    Elbow/Forearm (Therapeutic Exercise) AAROM (active assistive range of motion)  -TB     Elbow/Forearm AAROM (Therapeutic Exercise) right;flexion;extension;supination;pronation;sitting;10 repetitions  -TB       Row Name 05/24/24 1521          Wrist (Therapeutic Exercise)    Wrist (Therapeutic Exercise) AAROM (active assistive  range of motion)  -TB     Wrist AAROM (Therapeutic Exercise) right;flexion;extension;10 repetitions  -TB       Row Name 05/24/24 1521          Hand (Therapeutic Exercise)    Hand (Therapeutic Exercise) AROM (active range of motion)  -TB     Hand AROM/AAROM (Therapeutic Exercise) right;AROM (active range of motion);finger flexion;finger extension;10 repetitions  -TB       Row Name 05/24/24 1521          Motor Skills    Therapeutic Exercise hand;wrist;elbow/forearm  Education initiated for RUE HEP per MD parameters @ hand, wrist, and elbow only. Pt demonstrates good understanding.  -TB       Row Name 05/24/24 1521          Balance    Balance Assessment sitting dynamic balance;sit to stand dynamic balance;standing static balance;standing dynamic balance;sitting static balance  -TB     Static Sitting Balance standby assist;verbal cues  -TB     Dynamic Sitting Balance contact guard;verbal cues  -TB     Position, Sitting Balance sitting in chair;sitting edge of bed;supported  -TB     Sit to Stand Dynamic Balance minimal assist;1-person assist;verbal cues  -TB     Static Standing Balance contact guard;verbal cues  -TB     Dynamic Standing Balance minimal assist;verbal cues  -TB     Position/Device Used, Standing Balance supported  -TB     Balance Interventions sitting;standing;sit to stand;supported;static;dynamic;dynamic reaching;occupation based/functional task;UE activity with balance activity  -TB     Comment, Balance LOB x1 with assist to steady  -TB               User Key  (r) = Recorded By, (t) = Taken By, (c) = Cosigned By      Initials Name Provider Type    TB Nikole Flores OT Occupational Therapist                   Goals/Plan       Row Name 05/24/24 1528          Bathing Goal 1 (OT)    Activity/Device (Bathing Goal 1, OT) --  R axilla care  -TB     [place] Level/Cues Needed (Bathing Goal 1, OT) set-up required;standby assist  -TB     Time Frame (Bathing Goal 1, OT) short term goal (STG)  -TB      Progress/Outcomes (Bathing Goal 1, OT) goal ongoing  -TB       Row Name 05/24/24 1528          Dressing Goal 1 (OT)    Activity/Device (Dressing Goal 1, OT) upper body dressing  sling mgmt/proper fit  -TB     [place]/Cues Needed (Dressing Goal 1, OT) moderate assist (50-74% patient effort);verbal cues required  -TB     Time Frame (Dressing Goal 1, OT) long term goal (LTG)  -TB     Progress/Outcome (Dressing Goal 1, OT) goal ongoing  -TB       Row Name 05/24/24 1528          ROM Goal 1 (OT)    ROM Goal 1 (OT) Pt/caregiver demonstrate independence with RUE HEP per MD parameters @ hand, wrist, and elbow only: 10 reps, 3-5x/day  -TB     Time Frame (ROM Goal 1, OT) long term goal (LTG)  -TB     Progress/Outcome (ROM Goal 1, OT) goal ongoing  -TB               User Key  (r) = Recorded By, (t) = Taken By, (c) = Cosigned By      Initials Name Provider Type    TB Nikole Flores, OT Occupational Therapist                   Clinical Impression       Row Name 05/24/24 1523          Pain Assessment    Pain Intervention(s) Ambulation/increased activity;Repositioned  -TB     Additional Documentation Pain Scale: FACES Pre/Post-Treatment (Group)  -TB       Row Name 05/24/24 1523          Pain Scale: FACES Pre/Post-Treatment    Pain: FACES Scale, Pretreatment 4-->hurts little more  -TB     Posttreatment Pain Rating 4-->hurts little more  -TB     Pain Location - Side/Orientation Right  -TB     Pain Location other (see comments)  Axilla  -TB     Pain Location - shoulder  -TB     Pre/Posttreatment Pain Comment Pt c/o mild axilla pain only. Not worsened by activity.  -TB       Row Name 05/24/24 1523          Plan of Care Review    Plan of Care Reviewed With patient;friend  -TB     Progress --  IE  -TB     Outcome Evaluation OT IE completed. Pt is A/Ox4 and participates in therapy with encouragement. Pt up in room and transfering with Min Ax1. Presents with unsteady gait and posterior LOBx1. Pt failed the OT mobility screen.  Recommend PT assessment. Education initiated for R shoulder precautions, sling teaching, and ADL retraining. Max A sling mgmt. RUE HEP completed with AROM hand and AAROM wrist/elbow. OT will follow IP. Recommend home with friend to assist when pt is cleared by PT and medically ready to d/c.  -TB       Row Name 05/24/24 1523 05/24/24 1140       Therapy Assessment/Plan (OT)    Rehab Potential (OT) good, to achieve stated therapy goals  -TB --    Criteria for Skilled Therapeutic Interventions Met (OT) yes;meets criteria;skilled treatment is necessary  -TB --    Therapy Frequency (OT) daily  -TB --    Predicted Duration of Therapy Intervention (OT) Pt caregiver training was provided face-to-face on strategies and techniques related to activities of daily living, transfers, mobility, home safety, durable medical equipment, and brace management for 24 minutes without the patient present.  -TB Pt caregiver training was provided face-to-face on strategies and techniques related to activities of daily living, transfers, mobility, home safety, durable medical equipment, and brace management for 24 minutes without the patient present.  -TB      Row Name 05/24/24 1523          Therapy Plan Review/Discharge Plan (OT)    Anticipated Discharge Disposition (OT) home with assist  -TB       Row Name 05/24/24 1523          Vital Signs    Pre Systolic BP Rehab --  RN cleared OT  -TB     Pre SpO2 (%) 95  -TB     O2 Delivery Pre Treatment room air  -TB     Intra SpO2 (%) 93  -TB     O2 Delivery Intra Treatment room air  -TB     Post SpO2 (%) 97  -TB     O2 Delivery Post Treatment room air  -TB     Pre Patient Position Supine  -TB     Intra Patient Position Standing  -TB     Post Patient Position Sitting  -TB       Row Name 05/24/24 1523          Positioning and Restraints    Pre-Treatment Position in bed  -TB     Post Treatment Position chair  -TB     In Chair notified nsg;reclined;call light within reach;encouraged to call for  assist;exit alarm on;with family/caregiver;w[place]le cushion;legs elevated;with brace  -TB               User Key  (r) = Recorded By, (t) = Taken By, (c) = Cosigned By      Initials Name Provider Type    Nikole Bone OT Occupational Therapist                   Outcome Measures       Row Name 05/24/24 1532          How much help from another is currently needed...    Putting on and taking off regular lower body clothing? 2  -TB     Bathing (including washing, rinsing, and drying) 2  -TB     Toileting (which includes using toilet bed pan or urinal) 2  -TB     Putting on and taking off regular upper body clothing 2  -TB     Taking care of personal grooming (such as brushing teeth) 2  -TB     Eating meals 3  -TB     AM-PAC 6 Clicks Score (OT) 13  -TB       Row Name 05/24/24 1405          How much help from another person do you currently need...    Turning from your back to your side while in flat bed without using bedrails? 3  -RS     Moving from lying on back to sitting on the side of a flat bed without bedrails? 3  -RS     Moving to and from a bed to a chair (including a wheelchair)? 3  -RS     Standing up from a chair using your arms (e.g., wheelchair, bedside chair)? 3  -RS     Climbing 3-5 steps with a railing? 2  -RS     To walk in hospital room? 3  -RS     AM-PAC 6 Clicks Score (PT) 17  -RS     Highest Level of Mobility Goal 5 --> Static standing  -RS       Row Name 05/24/24 1532          Functional Assessment    Outcome Measure Options AM-PAC 6 Clicks Daily Activity (OT)  -TB               User Key  (r) = Recorded By, (t) = Taken By, (c) = Cosigned By      Initials Name Provider Type    Nikole Bone OT Occupational Therapist    RS Suellen Espino RN Registered Nurse                    Occupational Therapy Education       Title: PT OT SLP Therapies (In Progress)       Topic: Occupational Therapy (In Progress)       Point: ADL training (Done)       Description:   Instruct learner(s) on  proper safety adaptation and remediation techniques during self care or transfers.   Instruct in proper use of assistive devices.                  Learning Progress Summary             Patient Acceptance, E,D, VU,DU,NR by TB at 5/24/2024 1532   Caregiver Acceptance, E,D, VU,DU,NR by TB at 5/24/2024 1532                         Point: Home exercise program (Done)       Description:   Instruct learner(s) on appropriate technique for monitoring, assisting and/or progressing therapeutic exercises/activities.                  Learning Progress Summary             Patient Acceptance, E,D, VU,DU,NR by TB at 5/24/2024 1532   Caregiver Acceptance, E,D, VU,DU,NR by TB at 5/24/2024 1532                         Point: Precautions (Done)       Description:   Instruct learner(s) on prescribed precautions during self-care and functional transfers.                  Learning Progress Summary             Patient Acceptance, E,D, VU,DU,NR by TB at 5/24/2024 1532   Caregiver Acceptance, E,D, VU,DU,NR by TB at 5/24/2024 1532                         Point: Body mechanics (Not Started)       Description:   Instruct learner(s) on proper positioning and spine alignment during self-care, functional mobility activities and/or exercises.                  Learner Progress:  Not documented in this visit.                              User Key       Initials Effective Dates Name Provider Type Discipline     07/11/23 -  Nikole Flores OT Occupational Therapist OT                  OT Recommendation and Plan  Therapy Frequency (OT): daily  Plan of Care Review  Plan of Care Reviewed With: patient, friend  Progress:  (IE)  Outcome Evaluation: OT IE completed. Pt is A/Ox4 and participates in therapy with encouragement. Pt up in room and transfering with Min Ax1. Presents with unsteady gait and posterior LOBx1. Pt failed the OT mobility screen. Recommend PT assessment. Education initiated for R shoulder precautions, sling teaching, and ADL  retraining. Max A sling mgmt. RUE HEP completed with AROM hand and AAROM wrist/elbow. OT will follow IP. Recommend home with friend to assist when pt is cleared by PT and medically ready to d/c.     Time Calculation:   Evaluation Complexity (OT)  Review Occupational Profile/Medical/Therapy History Complexity: expanded/moderate complexity  Assessment, Occupational Performance/Identification of Deficit Complexity: 3-5 performance deficits  Clinical Decision Making Complexity (OT): detailed assessment/moderate complexity  Overall Complexity of Evaluation (OT): moderate complexity     Time Calculation- OT       Row Name 05/24/24 1428             Time Calculation- OT    OT Start Time 1428  -TB      OT Received On 05/24/24  -TB      OT Goal Re-Cert Due Date 06/03/24  -TB         Timed Charges    54993 - OT Therapeutic Activity Minutes 26  -TB         Untimed Charges    OT Eval/Re-eval Minutes 40  -TB         Total Minutes    Timed Charges Total Minutes 26  -TB      Untimed Charges Total Minutes 40  -TB       Total Minutes 66  -TB                User Key  (r) = Recorded By, (t) = Taken By, (c) = Cosigned By      Initials Name Provider Type    TB Nikole Flores OT Occupational Therapist                  Therapy Charges for Today       Code Description Service Date Service Provider Modifiers Qty    75098413933 HC CAREGIVER TRAINING STRATEGIES & TQ 1ST 30 MINUTES 5/24/2024 Nikole Flores OT  1    94679984245 HC OT THERAPEUTIC ACT EA 15 MIN 5/24/2024 Nikole Flores OT GO 2    18686914795 HC OT EVAL MOD COMPLEXITY 3 5/24/2024 Nikole Flores OT GO 1                 Nikole Flores OT  5/24/2024

## 2024-05-24 NOTE — H&P
Patient Name: Angela Rico  MRN: [id number]  : 1940  DOS: 2024    Attending: Spike Mhor MD    Primary Care Provider: Abdullahi Carter MD      Chief complaint: Right shoulder pain    Subjective   Patient is a 83 y.o.female presents for scheduled surgery by Dr. Mohr.  She reports a 30-year history of worsening right shoulder pain.  She has limited range of motion and occasionally difficulty with  of her right hand.  She denies numbness or tingling.  Conservative treatments failed to provide lasting benefits.   She underwent a REVERSE TOTAL SHOULDER ARTHROPLASTY - RIGHT today.  under GA and a block, tolerated surgery well, was admitted for further management.  Patient currently denies no fever or chills, no chest pain or shortness of breath, no nausea or vomiting       Allergies:  Allergies   Allergen Reactions    Meloxicam Rash    Penicillins Itching and Rash       Meds:  Medications Prior to Admission   Medication Sig Dispense Refill Last Dose    acetaminophen (TYLENOL) 500 MG tablet Take 1 tablet by mouth Daily. Taking 1-2 times a day   2024    benzoyl peroxide 5 % external wash Use as directed by Dr. Mohr 142 g 0 2024    fosinopril (MONOPRIL) 20 MG tablet Take 1 tablet by mouth Daily.   2024    hydroCHLOROthiazide 25 MG tablet Take 1 tablet by mouth Daily.   2024    mupirocin (BACTROBAN) 2 % ointment Apply 1 application in each nostril 2 times daily beginning 5 days before surgery. 22 g 0 2024    omeprazole (priLOSEC) 20 MG capsule Take 1 capsule by mouth Daily.   2024 at 0400    ibuprofen (ADVIL,MOTRIN) 200 MG tablet Take 1 tablet by mouth Daily.   2024    ondansetron (ZOFRAN) 4 MG tablet Take 1 tablet by mouth Every 8 (Eight) Hours As Needed for post-op nausea. 30 tablet 0          History:   Past Medical History:   Diagnosis Date    Arthritis     GERD (gastroesophageal reflux disease)     Hypertension     PONV (postoperative nausea and  "vomiting)      Past Surgical History:   Procedure Laterality Date    CATARACT EXTRACTION W/ INTRAOCULAR LENS IMPLANT Bilateral     COLONOSCOPY      ENDOSCOPY      TUBAL ABDOMINAL LIGATION      WRIST SURGERY Left      History reviewed. No pertinent family history.  Social History     Tobacco Use    Smoking status: Never    Smokeless tobacco: Never   Vaping Use    Vaping status: Never Used   Substance Use Topics    Alcohol use: Never    Drug use: Never       Review of Systems  Pertinent items are noted in HPI, all other systems reviewed and negative    Vital Signs  /72 (BP Location: Left arm, Patient Position: Lying)   Pulse 71   Temp 97.6 °F (36.4 °C) (Temporal)   Resp 18   Ht 157.5 cm (62\")   Wt 55.8 kg (123 lb)   SpO2 96%   BMI 22.50 kg/m²     Physical Exam:    General Appearance:    Alert, cooperative, in no acute distress   Head:    Normocephalic, without obvious abnormality, atraumatic   Eyes:            Lids and lashes normal, conjunctivae and sclerae normal, no   icterus, no pallor, corneas clear,    Ears:    Ears appear intact with no abnormalities noted   Throat:   No oral lesions, no thrush, oral mucosa moist   Neck:   No adenopathy, supple, trachea midline, no thyromegaly         Lungs:     Clear to auscultation,respirations regular, even and                   unlabored    Heart:    Regular rhythm and normal rate, normal S1 and S2, no      murmur    Abdomen:     Normal bowel sounds, no masses, no organomegaly, soft        non-tender, non-distended, no guarding, no rebound                 tenderness   Genitalia:    Deferred   Extremities: Right UE in a sling, CDI  dressing shoulder. Interscalene nerve block cath present.  Distal pulses, cap refill, movements of fingers, wrist, intact.     Pulses:   Pulses palpable and equal bilaterally   Skin:   No bleeding, bruising or rash   Neurologic:   Cranial nerves 2 - 12 grossly intact      I reviewed the patient's new clinical results.         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 PT/OT. NWB, right UE, ROM hand, wrist, elbow.  2. Pain control-prns, interscalene nerve block cath with ropivacaine infusion.   3. IS-encourage  4. DVT proph- Mech/ mobilize.  5. Bowel regimen  6. Resume home medications as appropriate  7. Monitor post-op labs  8. DC planning home tomorrow if she cleared by OT    Hypertension  Blood pressure start trending up  Start her lisinopril 20 mg daily  Hold HCTZ for now  Monitor blood pressure closely    GERD  Continue Protonix        Dragon disclaimer:  Part of this encounter note is an electronic transcription/translation of spoken language to printed text. The electronic translation of spoken language may permit erroneous, or at times, nonsensical words or phrases to be inadvertently transcribed; Although I have reviewed the note for such errors, some may still exist.    John Deutsch MD  05/24/24  16:10 EDT         "

## 2024-05-24 NOTE — PLAN OF CARE
Problem: Adult Inpatient Plan of Care  Goal: Plan of Care Review  Recent Flowsheet Documentation  Taken 5/24/2024 1523 by Nikole Flores OT  Progress: (IE) --  Plan of Care Reviewed With:   patient   friend  Outcome Evaluation: OT IE completed. Pt is A/Ox4 and participates in therapy with encouragement. Pt up in room and transfering with Min Ax1. Presents with unsteady gait and posterior LOBx1. Pt failed the OT mobility screen. Recommend PT assessment. Education initiated for R shoulder precautions, sling teaching, and ADL retraining. Max A sling mgmt. RUE HEP completed with AROM hand and AAROM wrist/elbow. OT will follow IP. Recommend home with friend to assist when pt is cleared by PT and medically ready to d/c.

## 2024-05-25 VITALS
BODY MASS INDEX: 22.63 KG/M2 | DIASTOLIC BLOOD PRESSURE: 70 MMHG | HEART RATE: 74 BPM | SYSTOLIC BLOOD PRESSURE: 128 MMHG | HEIGHT: 62 IN | WEIGHT: 123 LBS | OXYGEN SATURATION: 96 % | RESPIRATION RATE: 16 BRPM | TEMPERATURE: 98 F

## 2024-05-25 PROBLEM — Z96.611 STATUS POST REVERSE TOTAL REPLACEMENT OF RIGHT SHOULDER: Status: ACTIVE | Noted: 2024-05-25

## 2024-05-25 PROBLEM — K21.9 GERD WITHOUT ESOPHAGITIS: Status: ACTIVE | Noted: 2024-05-25

## 2024-05-25 PROBLEM — I10 HTN (HYPERTENSION): Status: ACTIVE | Noted: 2024-05-25

## 2024-05-25 LAB
ANION GAP SERPL CALCULATED.3IONS-SCNC: 11 MMOL/L (ref 5–15)
BASOPHILS # BLD AUTO: 0.01 10*3/MM3 (ref 0–0.2)
BASOPHILS NFR BLD AUTO: 0.1 % (ref 0–1.5)
BUN SERPL-MCNC: 19 MG/DL (ref 8–23)
BUN/CREAT SERPL: 19.6 (ref 7–25)
CALCIUM SPEC-SCNC: 8.5 MG/DL (ref 8.6–10.5)
CHLORIDE SERPL-SCNC: 101 MMOL/L (ref 98–107)
CO2 SERPL-SCNC: 26 MMOL/L (ref 22–29)
CREAT SERPL-MCNC: 0.97 MG/DL (ref 0.57–1)
DEPRECATED RDW RBC AUTO: 43.6 FL (ref 37–54)
EGFRCR SERPLBLD CKD-EPI 2021: 58.5 ML/MIN/1.73
EOSINOPHIL # BLD AUTO: 0 10*3/MM3 (ref 0–0.4)
EOSINOPHIL NFR BLD AUTO: 0 % (ref 0.3–6.2)
ERYTHROCYTE [DISTWIDTH] IN BLOOD BY AUTOMATED COUNT: 13.2 % (ref 12.3–15.4)
GLUCOSE SERPL-MCNC: 84 MG/DL (ref 65–99)
HCT VFR BLD AUTO: 32.6 % (ref 34–46.6)
HGB BLD-MCNC: 10.9 G/DL (ref 12–15.9)
IMM GRANULOCYTES # BLD AUTO: 0.08 10*3/MM3 (ref 0–0.05)
IMM GRANULOCYTES NFR BLD AUTO: 0.7 % (ref 0–0.5)
LYMPHOCYTES # BLD AUTO: 1.2 10*3/MM3 (ref 0.7–3.1)
LYMPHOCYTES NFR BLD AUTO: 10.8 % (ref 19.6–45.3)
MCH RBC QN AUTO: 29.9 PG (ref 26.6–33)
MCHC RBC AUTO-ENTMCNC: 33.4 G/DL (ref 31.5–35.7)
MCV RBC AUTO: 89.3 FL (ref 79–97)
MONOCYTES # BLD AUTO: 0.73 10*3/MM3 (ref 0.1–0.9)
MONOCYTES NFR BLD AUTO: 6.6 % (ref 5–12)
NEUTROPHILS NFR BLD AUTO: 81.8 % (ref 42.7–76)
NEUTROPHILS NFR BLD AUTO: 9.09 10*3/MM3 (ref 1.7–7)
NRBC BLD AUTO-RTO: 0 /100 WBC (ref 0–0.2)
PLATELET # BLD AUTO: 218 10*3/MM3 (ref 140–450)
PMV BLD AUTO: 10.6 FL (ref 6–12)
POTASSIUM SERPL-SCNC: 3.7 MMOL/L (ref 3.5–5.2)
RBC # BLD AUTO: 3.65 10*6/MM3 (ref 3.77–5.28)
SODIUM SERPL-SCNC: 138 MMOL/L (ref 136–145)
WBC NRBC COR # BLD AUTO: 11.11 10*3/MM3 (ref 3.4–10.8)

## 2024-05-25 PROCEDURE — 97161 PT EVAL LOW COMPLEX 20 MIN: CPT

## 2024-05-25 PROCEDURE — 97535 SELF CARE MNGMENT TRAINING: CPT

## 2024-05-25 PROCEDURE — 80048 BASIC METABOLIC PNL TOTAL CA: CPT | Performed by: ORTHOPAEDIC SURGERY

## 2024-05-25 PROCEDURE — 25010000002 CEFAZOLIN PER 500 MG: Performed by: ORTHOPAEDIC SURGERY

## 2024-05-25 PROCEDURE — 97530 THERAPEUTIC ACTIVITIES: CPT

## 2024-05-25 PROCEDURE — A9270 NON-COVERED ITEM OR SERVICE: HCPCS | Performed by: ORTHOPAEDIC SURGERY

## 2024-05-25 PROCEDURE — 63710000001 PANTOPRAZOLE 40 MG TABLET DELAYED-RELEASE: Performed by: INTERNAL MEDICINE

## 2024-05-25 PROCEDURE — 85025 COMPLETE CBC W/AUTO DIFF WBC: CPT | Performed by: ORTHOPAEDIC SURGERY

## 2024-05-25 PROCEDURE — 63710000001 ACETAMINOPHEN 325 MG TABLET: Performed by: ORTHOPAEDIC SURGERY

## 2024-05-25 PROCEDURE — A9270 NON-COVERED ITEM OR SERVICE: HCPCS | Performed by: INTERNAL MEDICINE

## 2024-05-25 PROCEDURE — 97110 THERAPEUTIC EXERCISES: CPT

## 2024-05-25 RX ORDER — ACETAMINOPHEN 500 MG
1000 TABLET ORAL EVERY 8 HOURS
Qty: 60 TABLET | Refills: 0 | Status: SHIPPED | OUTPATIENT
Start: 2024-05-25 | End: 2024-06-04

## 2024-05-25 RX ORDER — DOCUSATE SODIUM 100 MG/1
100 CAPSULE, LIQUID FILLED ORAL 2 TIMES DAILY
Qty: 30 CAPSULE | Refills: 0 | Status: SHIPPED | OUTPATIENT
Start: 2024-05-25 | End: 2024-06-09

## 2024-05-25 RX ORDER — OXYCODONE HYDROCHLORIDE 5 MG/1
5 TABLET ORAL EVERY 4 HOURS PRN
Qty: 25 TABLET | Refills: 0 | Status: SHIPPED | OUTPATIENT
Start: 2024-05-25

## 2024-05-25 RX ORDER — ROPIVACAINE HYDROCHLORIDE 2 MG/ML
1 INJECTION, SOLUTION EPIDURAL; INFILTRATION; PERINEURAL CONTINUOUS
Start: 2024-05-25

## 2024-05-25 RX ADMIN — ACETAMINOPHEN 650 MG: 325 TABLET ORAL at 10:09

## 2024-05-25 RX ADMIN — SODIUM CHLORIDE 2000 MG: 900 INJECTION INTRAVENOUS at 02:02

## 2024-05-25 RX ADMIN — PANTOPRAZOLE SODIUM 40 MG: 40 TABLET, DELAYED RELEASE ORAL at 05:19

## 2024-05-25 NOTE — PLAN OF CARE
Goal Outcome Evaluation:  Plan of Care Reviewed With: patient           Outcome Evaluation: Pt is indep with mobility. Skilled PT services are not indicated at this time. This was discussed and agreed up on with pt. Discharge PT.

## 2024-05-25 NOTE — THERAPY DISCHARGE NOTE
Patient Name: Angela Rico  : 1940    MRN: [id number]                              Today's Date: 2024       Admit Date: 2024    Visit Dx: No diagnosis found.  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     Past Medical History:   Diagnosis Date    Arthritis     GERD (gastroesophageal reflux disease)     Hypertension     PONV (postoperative nausea and vomiting)      Past Surgical History:   Procedure Laterality Date    CATARACT EXTRACTION W/ INTRAOCULAR LENS IMPLANT Bilateral     COLONOSCOPY      ENDOSCOPY      TUBAL ABDOMINAL LIGATION      WRIST SURGERY Left       General Information       Row Name 24          Physical Therapy Time and Intention    Document Type evaluation;discharge evaluation/summary  -LS     Mode of Treatment physical therapy  -LS       Row Name 24          General Information    Patient Profile Reviewed yes  -LS     Prior Level of Function independent:;all household mobility;community mobility  indep without a.d.  -LS     Existing Precautions/Restrictions right;shoulder;non-weight bearing  s/p R rTSA, RUE NWB in don.[employer] ultra II sling w/ abdction pillow, interscalene nerve cath  -LS     Barriers to Rehab none identified  -LS       Row Name 24          Living Environment    People in Home friend(s)  -LS       Row Name 24          Home Main Entrance    Number of Stairs, Main Entrance none  ramp  -LS       Row Name 24          Stairs Within Home, Primary    Number of Stairs, Within Home, Primary none  -LS       Row Name 24          Cognition    Orientation Status (Cognition) oriented x 4  -LS               User Key  (r) = Recorded By, (t) = Taken By, (c) = Cosigned By      Initials Name Provider Type    LS Silvia Hu, PT Physical Therapist                   Mobility       Row Name 24          Bed  Mobility    Bed Mobility supine-sit-supine  -LS     Supine-Sit-Supine [place] (Bed Mobility) independent  -LS     Comment, (Bed Mobility) indep transitioning on flat bed surface without bedrail  -[facility] Name 05/25/24 0930          Sit-Stand Transfer    Sit-Stand [place] (Transfers) independent  -[facility] Name 05/25/24 0930          Gait/Stairs (Locomotion)    [place] Level (Gait) independent  -LS     Patient was able to Ambulate yes  -LS     Distance in Feet (Gait) 300  -LS     Comment, (Gait/Stairs) step-through gait pattern. no assistive device.  -       Row Name 05/25/24 0930          Mobility    Extremity Weight-bearing Status right upper extremity  -LS     Right Upper Extremity (Weight-bearing Status) non weight-bearing (NWB)  -               User Key  (r) = Recorded By, (t) = Taken By, (c) = Cosigned By      Initials Name Provider Type    Silvia Le, PT Physical Therapist                   Obj/Interventions       Row Name 05/25/24 0930          Range of Motion Comprehensive    General Range of Motion lower extremity range of motion deficits identified  -LS       Row Name 05/25/24 0930          Strength Comprehensive (MMT)    Comment, General Manual Muscle Testing (MMT) Assessment B hip flexors 4+/5. B knee extensors and B ankle dorsiflexors 5/5.  -               User Key  (r) = Recorded By, (t) = Taken By, (c) = Cosigned By      Initials Name Provider Type    Silvia Le, PT Physical Therapist                   Goals/Plan    No documentation.                  Clinical Impression       Row Name 05/25/24 0930          Pain    Pretreatment Pain Rating 0/10 - no pain  -LS     Posttreatment Pain Rating 0/10 - no pain  -LS       Row Name 05/25/24 0930          Plan of Care Review    Plan of Care Reviewed With patient  -LS     Outcome Evaluation Pt is indep with mobility. Skilled PT services are not indicated at this time. This was discussed and agreed up on  with pt. Discharge PT.  -       Row Name 05/25/24 0930          Therapy Assessment/Plan (PT)    Therapy Frequency (PT) evaluation only  -               User Key  (r) = Recorded By, (t) = Taken By, (c) = Cosigned By      Initials Name Provider Type    Silvia Le, MARI Physical Therapist                   Outcome Measures       Row Name 05/25/24 0930          How much help from another person do you currently need...    Turning from your back to your side while in flat bed without using bedrails? 4  -LS     Moving from lying on back to sitting on the side of a flat bed without bedrails? 4  -LS     Moving to and from a bed to a chair (including a wheelchair)? 4  -LS     Standing up from a chair using your arms (e.g., wheelchair, bedside chair)? 4  -LS     Climbing 3-5 steps with a railing? 4  -LS     To walk in hospital room? 4  -LS     AM-PAC 6 Clicks Score (PT) 24  -LS     Highest Level of Mobility Goal 8 --> Walked 250 feet or more  -       Row Name 05/25/24 0930          Functional Assessment    Outcome Measure Options AM-PAC 6 Clicks Basic Mobility (PT)  -               User Key  (r) = Recorded By, (t) = Taken By, (c) = Cosigned By      Initials Name Provider Type    Silvia Le PT Physical Therapist                  Physical Therapy Education       Title: PT OT SLP Therapies (In Progress)       Topic: Physical Therapy (Resolved)       Point: Precautions (Resolved)       Learning Progress Summary             Patient Acceptance, E, VU by  at 5/25/2024 0930    Comment: Pt to call for assist when she wants to get up d/t IV pole; however, pt is indep with mobility and cleared to be up ad david if not attached to equipment.                                         User Key       Initials Effective Dates Name Provider Type Discipline     07/11/23 -  Silvia Hu, MARI Physical Therapist PT                  PT Recommendation and Plan     Plan of Care Reviewed With: patient  Outcome  Evaluation: Pt is indep with mobility. Skilled PT services are not indicated at this time. This was discussed and agreed up on with pt. Discharge PT.     Time Calculation:   PT Evaluation Complexity  History, PT Evaluation Complexity: 1-2 personal factors and/or comorbidities  Examination of Body Systems (PT Eval Complexity): total of 4 or more elements  Clinical Presentation (PT Evaluation Complexity): stable  Clinical Decision Making (PT Evaluation Complexity): low complexity  Overall Complexity (PT Evaluation Complexity): low complexity     PT Charges       Row Name 05/25/24 0930             Time Calculation    Start Time 0930  -LS      PT Received On 05/25/24  -LS         Untimed Charges    PT Eval/Re-eval Minutes 40  -LS         Total Minutes    Untimed Charges Total Minutes 40  -LS       Total Minutes 40  -LS                User Key  (r) = Recorded By, (t) = Taken By, (c) = Cosigned By      Initials Name Provider Type    Silvia Le, PT Physical Therapist                  Therapy Charges for Today       Code Description Service Date Service Provider Modifiers Qty    97050352568 HC PT EVAL LOW COMPLEXITY 3 5/25/2024 Silvia Hu, PT GP 1            PT G-Codes  Outcome Measure Options: AM-PAC 6 Clicks Basic Mobility (PT)  AM-PAC 6 Clicks Score (PT): 24  AM-PAC 6 Clicks Score (OT): 13    PT Discharge Summary  Anticipated Discharge Disposition (PT): home  Reason for Discharge: Independent    Silvia Hu, PT  5/25/2024

## 2024-05-25 NOTE — PROGRESS NOTES
"Orthopedic Daily Progress Note      CC: Reverse TSA    Pain well controlled  General: no fevers, chills  Abdomen: no nausea, vomiting, or diarrhea    No other complaints    Physical Exam:  I have reviewed the vital signs.  Temp:  [96.4 °F (35.8 °C)-98 °F (36.7 °C)] 98 °F (36.7 °C)  Heart Rate:  [68-82] 74  Resp:  [14-20] 16  BP: (125-160)/(65-83) 128/70    Objective:  Vital signs: (most recent): Blood pressure 128/70, pulse 74, temperature 98 °F (36.7 °C), temperature source Axillary, resp. rate 16, height 157.5 cm (62\"), weight 55.8 kg (123 lb), SpO2 96%.              General Appearance:    Alert, cooperative, no distress  Extremities: No clubbing, cyanosis, or edema to lower extremities  Pulses:  2+ in distal surgical extremity  Skin: Dressing Clean/dry/intact      Results Review:    I have reviewed the labs, radiology results and diagnostic studies:    Results from last 7 days   Lab Units 05/25/24  0659   WBC 10*3/mm3 11.11*   HEMOGLOBIN g/dL 10.9*   PLATELETS 10*3/mm3 218     Results from last 7 days   Lab Units 05/25/24  0659   SODIUM mmol/L 138   POTASSIUM mmol/L 3.7   CO2 mmol/L 26.0   CREATININE mg/dL 0.97   GLUCOSE mg/dL 84       I have reviewed the medications.    Assessment/Problem List  POD# 1 Day Post-Op   S/p reverse TSA    Plan  1) sling   2) elbow wrist and hand ROM only  3) dressing to remain in place until followup  4) ISB block   5) po pain control      Discharge Planning: I expect patient to be discharged to home likely today    Spike Mohr MD  05/25/24  10:35 EDT            " Detail Level: Simple Additional Notes: Patient consent was obtained to proceed with the visit and recommended plan of care after discussion of all risks and benefits, including the risks of COVID-19 exposure.

## 2024-05-25 NOTE — PLAN OF CARE
Goal Outcome Evaluation:  Plan of Care Reviewed With: patient, friend        Progress: improving  Outcome Evaluation: Pt alert and participatory in OT interventions w/ friend present that was engaged in caregiver training related to R rTSA. Pt and friend reviewed on optimal position, tech, seq for ADLs, related t/fs, mobility while adhering to RUE precautions throughout. Pt continues to demonstrate need for A for dressing, R axilla care, less A for toileting yet provided care for safety purposes with friend and pt demonstrating greater competency in tech, strategy, safety. Pt tolerated BUE TE as ordered with need for AROM at hand, wrist and AAROM at elbow, mild increase in pain noted w/ completion. SPO2 > 90% on RA. Pt req'd gross SBA to CGA for fxl t/fs and mobility witihout AD with no LOB noted. PT mobility assessment to follow as ordered. Recommend cont'd IPOT POC and home w/ A upon d/c when medically ready.      Anticipated Discharge Disposition (OT): home with assist

## 2024-05-25 NOTE — DISCHARGE SUMMARY
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   Risks and benefits of opiate medications discussed with patient. ROSA ISELA report on chart was reviewed.    The patient was seen by OT and was taught exercises for *** arm.  The patient used an IS for atelectasis prophylaxis and mechanicals for DVT prophylaxis.    Home medications were resumed as appropriate, and labs were monitored and remained fairly stable.     With the progress *** has made, *** is ready for DC *** today.      The patient will have an Infupump ( instructed on it during this admit)  Discussed with patient regarding plan and *** shows understanding and agreement.        Procedures Performed  Procedure(s):  REVERSE TOTAL SHOULDER ARTHROPLASTY - RIGHT       Pertinent Test Results:    I reviewed the patient's new clinical results.   Results from last 7 days   Lab Units 24  0659   WBC 10*3/mm3 11.11*   HEMOGLOBIN g/dL 10.9*   HEMATOCRIT % 32.6*   PLATELETS 10*3/mm3 218     Results from last 7 days   Lab Units 24  0659 24  0901   SODIUM mmol/L 138  --    POTASSIUM mmol/L 3.7 3.5   CHLORIDE mmol/L 101  --    CO2 mmol/L 26.0  --    BUN mg/dL 19  --    CREATININE mg/dL 0.97  --    CALCIUM mg/dL 8.5*  --    GLUCOSE mg/dL 84  --      I reviewed the patient's new  "imaging including images and reports.      Occupational Therapy  ***    Physical therapy  ***    Discharge Assessment:       Visit Vitals  /70 (BP Location: Left arm, Patient Position: Sitting)   Pulse 74   Temp 98 °F (36.7 °C) (Axillary)   Resp 16   Ht 157.5 cm (62\")   Wt 55.8 kg (123 lb)   SpO2 96%   BMI 22.50 kg/m²     Temp (24hrs), Av.5 °F (36.4 °C), Min:96.4 °F (35.8 °C), Max:98 °F (36.7 °C)      General Appearance:    Alert, cooperative, in no acute distress   Lungs:     Clear to auscultation,respirations regular, even and   unlabored    Heart:    Regular rhythm and normal rate, normal S1 and S2    Abdomen:     Normal bowel sounds, no masses, no organomegaly, soft        non-tender, non-distended, no guarding, no rebound                 tenderness   Extremities:   ***UE in a sling, CDI *** dressing over ***shoulder incision . Interscalene nerve block cath present.  Distal pulses, cap refill,  intact. Movement and sensation ***     Pulses:   Pulses palpable and equal bilaterally   Skin:   No bleeding, bruising or rash        Discharge Disposition:***          Discharge Medications        New Medications        Instructions Start Date   docusate sodium 100 MG capsule  Commonly known as: COLACE   100 mg, Oral, 2 Times Daily      oxyCODONE 5 MG immediate release tablet  Commonly known as: Roxicodone   5 mg, Oral, Every 4 Hours PRN      ropivacaine 0.2 % infusion (INFUSYSTEM)  Commonly known as: NAROPIN   1 mL/hr (2 mg/hr), Peripheral Nerve, Continuous             Changes to Medications        Instructions Start Date   acetaminophen 500 MG tablet  Commonly known as: TYLENOL  What changed: You were already taking a medication with the same name, and this prescription was added. Make sure you understand how and when to take each.   1,000 mg, Oral, Every 8 Hours, Take every 8 hours  as needed after 1 week      acetaminophen 500 MG tablet  Commonly known as: TYLENOL  What changed: Another medication with the " same name was added. Make sure you understand how and when to take each.   500 mg, Oral, Daily, Taking 1-2 times a day             Continue These Medications        Instructions Start Date   fosinopril 20 MG tablet  Commonly known as: MONOPRIL   20 mg, Oral, Daily      hydroCHLOROthiazide 25 MG tablet   25 mg, Oral, Daily      ibuprofen 200 MG tablet  Commonly known as: ADVIL,MOTRIN   200 mg, Oral, Daily      omeprazole 20 MG capsule  Commonly known as: priLOSEC   20 mg, Oral, Daily      ondansetron 4 MG tablet  Commonly known as: ZOFRAN   Take 1 tablet by mouth Every 8 (Eight) Hours As Needed for post-op nausea.             Stop These Medications      benzoyl peroxide 5 % external wash  Generic drug: benzoyl peroxide     mupirocin 2 % ointment  Commonly known as: BACTROBAN              Discharge Diet:   Diet Instructions    Regular            Activity at Discharge:   Activity Instructions      1) sling on all times except bathing and dressing   2) elbow wrist and hand ROM only  3) dressing to remain in place until followup  4) nerve block in place-may shower once removed   5) po pain control as needed               NWB ***, ROM elbow, wrist, and hand per *** instructions.    Follow-up Appointments:  No future appointments.         Bernardino Araujo MD  05/25/24  13:21 EDT               hydroCHLOROthiazide 25 MG tablet   25 mg, Oral, Daily      ibuprofen 200 MG tablet  Commonly known as: ADVIL,MOTRIN   200 mg, Oral, Daily      omeprazole 20 MG capsule  Commonly known as: priLOSEC   20 mg, Oral, Daily      ondansetron 4 MG tablet  Commonly known as: ZOFRAN   Take 1 tablet by mouth Every 8 (Eight) Hours As Needed for post-op nausea.             Stop These Medications      benzoyl peroxide 5 % external wash  Generic drug: benzoyl peroxide     mupirocin 2 % ointment  Commonly known as: BACTROBAN               Diet Instructions    Regular            Activity at Discharge:   Activity Instructions      1) sling on all times except bathing and dressing   2) elbow wrist and hand ROM only  3) dressing to remain in place until followup  4) nerve block in place-may shower once removed   5) po pain control as needed               NWB right upper extremity, ROM elbow, wrist, and hand per Dr. Mohr's instructions.    Follow-up Appointments:  Spike Mohr MD per his orders       Bernardino Araujo MD  05/25/24  13:21 EDT

## 2024-05-25 NOTE — DISCHARGE INSTR - ACTIVITY
1) sling on all times except bathing and dressing   2) elbow wrist and hand ROM only  3) dressing to remain in place until followup  4) nerve block in place-may shower once removed   5) po pain control as needed

## 2024-05-25 NOTE — THERAPY TREATMENT NOTE
Patient Name: Angela Rico  : 1940    MRN: [id number]                              Today's Date: 2024       Admit Date: 2024    Visit Dx: No diagnosis found.  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     Past Medical History:   Diagnosis Date    Arthritis     GERD (gastroesophageal reflux disease)     Hypertension     PONV (postoperative nausea and vomiting)      Past Surgical History:   Procedure Laterality Date    CATARACT EXTRACTION W/ INTRAOCULAR LENS IMPLANT Bilateral     COLONOSCOPY      ENDOSCOPY      TUBAL ABDOMINAL LIGATION      WRIST SURGERY Left       General Information       Row Name 24 0926          OT Time and Intention    Document Type therapy note (daily note)  -JY     Mode of Treatment occupational therapy;individual therapy  -JY       Row Name 24          General Information    Patient Profile Reviewed yes  -JY     Existing Precautions/Restrictions right;shoulder;non-weight bearing;other (see comments);fall  s/p R rTSA, RUE NWB in [email or web address] ultra II sling w/ abdction pillow, interscalene nerve cath  -JY     Barriers to Rehab none identified  -JY       Row Name 24          Cognition    Orientation Status (Cognition) oriented x 4  -JY       Row Name 24          Safety Issues, Functional Mobility    Safety Issues Affecting Function (Mobility) insight into deficits/self-awareness;awareness of need for assistance;safety precaution awareness;safety precautions follow-through/compliance;sequencing abilities  -JY     Impairments Affecting Function (Mobility) balance;pain;strength;sensation/sensory awareness;range of motion (ROM);postural/trunk control  -JY     Comment, Safety Issues/Impairments (Mobility) pt alert and able to follow commands, very involved in care; req'd cues for adherence to no AROM at R shoulder  -JY               User Key  (r) =  Recorded By, (t) = Taken By, (c) = Cosigned By      Initials Name Provider Type    Rosina Alexander OT Occupational Therapist                     Mobility/ADL's       Row Name 05/25/24 0928          Bed Mobility    Bed Mobility supine-sit;scooting/bridging  -CARA     Scooting/Bridging [place] (Bed Mobility) standby assist;verbal cues  -CARA     Supine-Sit [place] (Bed Mobility) standby assist;verbal cues  -CARA     Bed Mobility, Safety Issues decreased use of arms for pushing/pulling  -CARA     Assistive Device (Bed Mobility) head of bed elevated  -CARA     Comment, (Bed Mobility) bed slightly elevated with pillow support otherwise flat to simulate at home environment; skilled cues for optimal seq and hand placement with education to exit to L side to adhere to RUE precautions; pt advanced LEs and sat self upward without physical A just gross SBA for safety and adherence to precautions; pt denied any dizziness or feeling LH  -CARA       Row Name 05/25/24 0928          Transfers    Transfers sit-stand transfer;stand-sit transfer;toilet transfer  -CARA     Comment, (Transfers) skilled cues for optimal hand placement for controlled ascend, descend specifically to push self upward and reach back to seated surface using LUE to adhere to NWB at RUE; further cues to align self close to seated surfaces and slightly toward L side to allow for sling mgmt at R side  -CARA       Row Name 05/25/24 0928          Sit-Stand Transfer    Sit-Stand [place] (Transfers) contact guard;verbal cues  -CARA     Assistive Device (Sit-Stand Transfers) other (see comments)  LUE support  -CARA       Row Name 05/25/24 0928          Stand-Sit Transfer    Stand-Sit [place] (Transfers) standby assist;verbal cues  -CARA     Assistive Device (Stand-Sit Transfers) other (see comments)  LUE support  -CARA       Row Name 05/25/24 0928          Toilet Transfer    Type (Toilet Transfer) sit-stand;stand-sit  -CARA     [place] Level (Toilet Transfer)  standby assist;verbal cues  -JY     Assistive Device (Toilet Transfer) commode;raised toilet seat;other (see comments)  LUE support  -       Row Name 05/25/24 0928          Functional Mobility    Functional Mobility- Ind. Level contact guard assist;verbal cues required  -     Functional Mobility- Device other (see comments)  -     Functional Mobility-Distance (Feet) --  in room ADL related mobility  -J     Functional Mobility- Comment defer to PT for specifics however during in room ADL related mobility pt req'd gross CGA with LUE support; cues for closely monitoring placement of self through doorways, when turning etc to avoid contact at RUE in sling; pt with more paced, cautious mobility however no LOB noted; PT to complete further mobility assessment as ordered last session; SPO2 > 90% throughout on RA  -JY     Patient was able to Ambulate yes  -       Row Name 05/25/24 0928          Activities of Daily Living    BADL Assessment/Intervention bathing;upper body dressing;lower body dressing;grooming;toileting;feeding  -[facility] Name 05/25/24 0928          Mobility    Extremity Weight-bearing Status right upper extremity  -JY     Right Upper Extremity (Weight-bearing Status) non weight-bearing (NWB)   -       Row Name 05/25/24 0928          Bathing Assessment/Intervention    [place] Level (Bathing) bathing skills;upper extremities;other (see comments)  R axilla  -JY     Assistive Devices (Bathing) other (see comments)  kitty tech  -JY     Position (Bathing) unsupported sitting  -JY     Comment, (Bathing) educated pt and friend on optimal position, tech, seq for R axilla bathing while adhering to all precautions throughout; emphasized no showering while nerve cath still in place and following surgeon orders re: bathing afterward; pt able to assist in positioning of RUE and tolerated max A for axilla bathing  -[employer]       Row Name 05/25/24 0928          Upper Body Dressing Assessment/Training     [place] Level (Upper Body Dressing) doff;pajama/robe;don;bra/undergarment;other (see comments);maximum assist (25% patient effort)  sling mgmt with max A needed, friend demonstrates ability to assist with CGA and v/cs  -JY     Assistive Devices (Upper Body Dressing) other (see comments)  kitty tech  -JY     Position (Upper Body Dressing) unsupported sitting  -JY     Comment, (Upper Body Dressing) educated pt and friend on optimal position, tech, seq for UBD while adhering to all precautions throughout; emphasized close monitoring of nerve cath to avoid dislodging; reviewed seq for threading and unthreading given more impacted RUE; educated both on sling mgmt including wear schedule, fit and adjustment, strap mgmt; pt req'd A for safety in both tasks with friend demonstrating improved competency and return demo; pt further limited by baseline L UE ROM deficits  -JY       Row Name 05/25/24 0928          Grooming Assessment/Training    [place] Level (Grooming) hair care, combing/brushing;wash face, hands;minimum assist (75% patient effort);verbal cues  -JY     Position (Grooming) supported sitting  -JY     Comment, (Grooming) pt able to brush ~75% hair using LUE and demonstrating forward flexion at trunk, head and neck to compensate for L shoulder ROM deficits  -JY       Row Name 05/25/24 0928          Self-Feeding Assessment/Training    [place] Level (Feeding) prepare tray/open items;dependent (less than 25% patient effort);finger foods;liquids to mouth;supervision;verbal cues  -JY     Position (Self-Feeding) supported sitting  -JY     Comment, (Feeding) dep for set up, able to hand finger foods and cp in LUE and could bring to mouth  -JY       Row Name 05/25/24 0928          Lower Body Dressing Assessment/Training    [place] Level (Lower Body Dressing) doff;don;socks;other (see comments);dependent (less than 25% patient effort)  mgmt of socks for improved fit and safety prior to standing  -JY      Position (Lower Body Dressing) supported sitting  -[facility] Name 05/25/24 0928          Toileting Assessment/Training    [place] Level (Toileting) adjust/manage clothing;contact guard assist;perform perineal hygiene;standby assist  -     Assistive Devices (Toileting) commode;raised toilet seat  -     Position (Toileting) unsupported sitting;supported standing  -     Comment, (Toileting) CGA for balance during clothing mgmt, brief A at R lateral side d/t sling mgmt; no A with hygiene while seated  -               User Key  (r) = Recorded By, (t) = Taken By, (c) = Cosigned By      Initials Name Provider Type    Rosina Alexander OT Occupational Therapist                   Obj/Interventions       [facility] Name 05/25/24 1119          Sensory Assessment (Somatosensory)    Sensory Assessment (Somatosensory) bilateral UE;sensation intact  -     Right UE Sensory Assessment general sensation;light touch awareness;intact  -     Sensory Assessment denies any numbness or tingling at either UE  -JY       Row Name 05/25/24 1119          Elbow/Forearm (Therapeutic Exercise)    Elbow/Forearm (Therapeutic Exercise) AAROM (active assistive range of motion)  -     Elbow/Forearm AAROM (Therapeutic Exercise) right;flexion;extension;supination;pronation;sitting;10 repetitions  -JY       Row Name 05/25/24 1119          Wrist (Therapeutic Exercise)    Wrist (Therapeutic Exercise) AROM (active range of motion)  -     Wrist AROM (Therapeutic Exercise) right;flexion;extension;10 repetitions  -JY       Row Name 05/25/24 1119          Hand (Therapeutic Exercise)    Hand (Therapeutic Exercise) AROM (active range of motion)  -     Hand AROM/AAROM (Therapeutic Exercise) right;AROM (active range of motion);finger flexion;finger extension;10 repetitions  -[facility] Name 05/25/24 1119          Motor Skills    Therapeutic Exercise elbow/forearm;wrist;hand;other (see comments)  facilitated elbow, wrist, hand ROM as ordered per  ortho  -JY       Row Name 05/25/24 1119          Balance    Balance Assessment sitting static balance;sitting dynamic balance;standing static balance;standing dynamic balance  -JY     Static Sitting Balance supervision  -JY     Dynamic Sitting Balance standby assist  -JY     Position, Sitting Balance supported;unsupported;sitting in chair  -JY     Static Standing Balance standby assist;verbal cues  -JY     Dynamic Standing Balance contact guard;verbal cues  -JY     Position/Device Used, Standing Balance supported;other (see comments)  LUE support  -JY     Balance Interventions sitting;standing;static;dynamic;sit to stand;supported;occupation based/functional task  -JY     Comment, Balance no overt LOB during seated or standing tasks, LUE support provided as needed, pt demonstrated slower paced and cautious mobility  -JY               User Key  (r) = Recorded By, (t) = Taken By, (c) = Cosigned By      Initials Name Provider Type    Rosina Alexander, ELIEZER Occupational Therapist                   Goals/Plan    No documentation.                  Clinical Impression       Row Name 05/25/24 1121          Pain Assessment    Pretreatment Pain Rating 2/10  -JY     Posttreatment Pain Rating 3/10  -JY     Pain Location - Side/Orientation Right  -JY     Pain Location posterior  -JY     Pain Location - other (see comments)  axilla  -JY     Pre/Posttreatment Pain Comment pt reported persistent pain at R axilla, posterior UE with increase reported during TE however improved following OT interventions  -JY     Pain Intervention(s) Repositioned;Ambulation/increased activity;Rest;Nursing Notified;Cold pack;Cold applied  -JY       Row Name 05/25/24 1121          Plan of Care Review    Plan of Care Reviewed With patient;friend  -JY     Progress improving  -JY     Outcome Evaluation Pt alert and participatory in OT interventions w/ friend present that was engaged in caregiver training related to R rTSA. Pt and friend reviewed on optimal  position, tech, seq for ADLs, related t/fs, mobility while adhering to RUE precautions throughout. Pt continues to demonstrate need for A for dressing, R axilla care, less A for toileting yet provided care for safety purposes with friend and pt demonstrating greater competency in tech, strategy, safety. Pt tolerated BUE TE as ordered with need for AROM at hand, wrist and AAROM at elbow, mild increase in pain noted w/ completion. SPO2 > 90% on RA. Pt req'd gross SBA to CGA for fxl t/fs and mobility witihout AD with no LOB noted. PT mobility assessment to follow as ordered. Recommend cont'd IPOT POC and home w/ A upon d/c when medically ready.  -[employer]       Row Name 05/25/24 1121          Therapy Assessment/Plan (OT)    Patient/Family Therapy Goal Statement (OT) to maximize I in ADLs, related t/fs, mobility, return to PLOF  -JY     Rehab Potential (OT) good, to achieve stated therapy goals  -JY     Criteria for Skilled Therapeutic Interventions Met (OT) yes;meets criteria;skilled treatment is necessary  -JY     Therapy Frequency (OT) daily  -[employer]       Row Name 05/25/24 1121          Therapy Plan Review/Discharge Plan (OT)    Anticipated Discharge Disposition (OT) home with assist  -[employer]       Row Name 05/25/24 1121          Vital Signs    Pre Systolic BP Rehab 128  -JY     Pre Treatment Diastolic BP 70  -JY     Pre SpO2 (%) 96  -JY     O2 Delivery Pre Treatment room air  -JY     Intra SpO2 (%) 95  -JY     O2 Delivery Intra Treatment room air  -JY     Post SpO2 (%) 97  -JY     O2 Delivery Post Treatment room air  -JY       Row Name 05/25/24 1121          Positioning and Restraints    Pre-Treatment Position in bed  -JY     Post Treatment Position chair  -JY     In Chair notified nsg;reclined;call light within reach;encouraged to call for assist;exit alarm on;with family/caregiver;compression device;waffle cushion;with brace;legs elevated  ice pack to R shoulder, nerve cath still being used  -JY               User Key  (r) =  Recorded By, (t) = Taken By, (c) = Cosigned By      Initials Name Provider Type    Rosina Alexander OT Occupational Therapist                   Outcome Measures       Row Name 05/25/24 1131          How much help from another is currently needed...    Putting on and taking off regular lower body clothing? 2  -JY     Bathing (including washing, rinsing, and drying) 2  -JY     Toileting (which includes using toilet bed pan or urinal) 3  -JY     Putting on and taking off regular upper body clothing 2  -JY     Taking care of personal grooming (such as brushing teeth) 3  -JY     Eating meals 3  -JY     AM-PAC 6 Clicks Score (OT) 15  -JY       Row Name 05/25/24 1009 05/25/24 0930       How much help from another person do you currently need...    Turning from your back to your side while in flat bed without using bedrails? 4  -AP 4  -LS    Moving from lying on back to sitting on the side of a flat bed without bedrails? 4  -AP 4  -LS    Moving to and from a bed to a chair (including a wheelchair)? 4  -AP 4  -LS    Standing up from a chair using your arms (e.g., wheelchair, bedside chair)? 4  -AP 4  -LS    Climbing 3-5 steps with a railing? 4  -AP 4  -LS    To walk in hospital room? 4  -AP 4  -LS    AM-PAC 6 Clicks Score (PT) 24  -AP 24  -LS    Highest Level of Mobility Goal 8 --> Walked 250 feet or more  -AP 8 --> Walked 250 feet or more  -LS      Row Name 05/25/24 1131 05/25/24 0930       Functional Assessment    Outcome Measure Options AM-PAC 6 Clicks Daily Activity (OT)  -JY AM-PAC 6 Clicks Basic Mobility (PT)  -LS              User Key  (r) = Recorded By, (t) = Taken By, (c) = Cosigned By      Initials Name Provider Type    Silvia Le, PT Physical Therapist    Tammy Walls, RN Registered Nurse    Rosina Alexander, ELIEZER Occupational Therapist                    Occupational Therapy Education       Title: PT OT SLP Therapies (In Progress)       Topic: Occupational Therapy (In Progress)       Point:  ADL training (In Progress)       Description:   Instruct learner(s) on proper safety adaptation and remediation techniques during self care or transfers.   Instruct in proper use of assistive devices.                  Learning Progress Summary             Patient Acceptance, E,D, NR by JY at 5/25/2024 0759    Acceptance, E,D, VU,DU,NR by TB at 5/24/2024 1532   Caregiver Acceptance, E,D, VU,DU,NR by TB at 5/24/2024 1532   Other Acceptance, E,D, NR by JY at 5/25/2024 0759                         Point: Home exercise program (In Progress)       Description:   Instruct learner(s) on appropriate technique for monitoring, assisting and/or progressing therapeutic exercises/activities.                  Learning Progress Summary             Patient Acceptance, E,D, NR by CARA at 5/25/2024 0759    Acceptance, E,D, VU,DU,NR by TB at 5/24/2024 1532   Caregiver Acceptance, E,D, VU,DU,NR by TB at 5/24/2024 1532   Other Acceptance, E,D, NR by JY at 5/25/2024 0759                         Point: Precautions (In Progress)       Description:   Instruct learner(s) on prescribed precautions during self-care and functional transfers.                  Learning Progress Summary             Patient Acceptance, E,D, NR by CARA at 5/25/2024 0759    Acceptance, E,D, VU,DU,NR by TB at 5/24/2024 1532   Caregiver Acceptance, E,D, VU,DU,NR by TB at 5/24/2024 1532   Other Acceptance, E,D, NR by JY at 5/25/2024 0759                         Point: Body mechanics (In Progress)       Description:   Instruct learner(s) on proper positioning and spine alignment during self-care, functional mobility activities and/or exercises.                  Learning Progress Summary             Patient Acceptance, E,D, NR by CARA at 5/25/2024 0759   Other Acceptance, E,D, NR by JY at 5/25/2024 0759                                         User Key       Initials Effective Dates Name Provider Type Discipline     07/11/23 -  Nikole Flores, OT Occupational Therapist  OT    CARA 06/16/21 -  Rosina Alaniz OT Occupational Therapist OT                  OT Recommendation and Plan  Therapy Frequency (OT): daily  Plan of Care Review  Plan of Care Reviewed With: patient, friend  Progress: improving  Outcome Evaluation: Pt alert and participatory in OT interventions w/ friend present that was engaged in caregiver training related to R rTSA. Pt and friend reviewed on optimal position, tech, seq for ADLs, related t/fs, mobility while adhering to RUE precautions throughout. Pt continues to demonstrate need for A for dressing, R axilla care, less A for toileting yet provided care for safety purposes with friend and pt demonstrating greater competency in tech, strategy, safety. Pt tolerated BUE TE as ordered with need for AROM at hand, wrist and AAROM at elbow, mild increase in pain noted w/ completion. SPO2 > 90% on RA. Pt req'd gross SBA to CGA for fxl t/fs and mobility witihout AD with no LOB noted. PT mobility assessment to follow as ordered. Recommend cont'd IPOT POC and home w/ A upon d/c when medically ready.     Time Calculation:         Time Calculation- OT       Row Name 05/25/24 1133             Time Calculation- OT    OT Start Time 0759  -JY      OT Received On 05/25/24  -JY      OT Goal Re-Cert Due Date 06/03/24  -JY         Timed Charges    10191 - OT Therapeutic Exercise Minutes 15  -JY      95374 - OT Therapeutic Activity Minutes 26  -JY      66622 - OT Self Care/Mgmt Minutes 38  -JY         Total Minutes    Timed Charges Total Minutes 79  -JY       Total Minutes 79  -JY                User Key  (r) = Recorded By, (t) = Taken By, (c) = Cosigned By      Initials Name Provider Type    Rosina Alexander OT Occupational Therapist                  Therapy Charges for Today       Code Description Service Date Service Provider Modifiers Qty    73939814003 HC OT THER PROC EA 15 MIN 5/25/2024 Rosina Alaniz OT GO 1    26478810832 HC OT THERAPEUTIC ACT EA 15 MIN 5/25/2024 [place]  ELIEZER Almaguer GO 2    00706272758 HC OT SELF CARE/MGMT/TRAIN EA 15 MIN 5/25/2024 Rosina Alaniz OT GO 2                 Rosina Alaniz OT  5/25/2024

## 2024-05-25 NOTE — DISCHARGE INSTRUCTIONS
InfuBLOCK - Patient Information    What is a pain pump?  The InfuBLOCK pump delivers post-operative, non-narcotic, numbing medication to the nerve near the surgical site for pain relief.     Where can I find information about my pain pump?           For more information about your pain pump, scan the QR code.  For additional patient resources, visit [email or web address].                                                                                               While your physician is your primary source for information about your treatment there may be times during your treatment that you need assistance with your infusion pump.     If you need assistance take the following steps:    The [employer] [facility] Nursing Hotline is Here for You 24/7.  Please call [phone] for the following concerns or complications:    Answers to questions about your infusion pump                 Tubing disconnect  Assistance with pump alarms                                                      Dislodged catheter  Excessive leakage noted from pump                                         Inadequate pain control    2.   [facility] Anesthesia Acute Pain Service: [phone] is available 24/7 for any further needs or concerns about medication or pain control.     -------------------------------------------------------------------------    Nerve Catheter Removal Instructions  When your device is empty:    Remove your catheter by pulling the dressing off slowly (like you would remove a regular bandage). The catheter should pull right out of the skin.  Check that the BLUE tip is intact.                                                                                     If the catheter is stuck, reposition your   extremity and pull slowly until removed.  *If catheter is HURTING and WON'T come out, stop and call [phone] for further assistance.    Remove medication bag from the black carrying case.  Cut the  tubing on right and left side of pump, and discard the medication bag and tubing into garbage.  Place the pump and black carrying case into the plastic bag and then place this into the return box.  Seal box with blue stickers and return to [employer] postal service. THIS IS PRE-PAID POSTAGE.        -------------------------------------------------------------------------    [facility] COLD THERAPY - PATIENT INSTRUCTION SHEET    Cold Compression Therapy for your comfort and rehabilitation  Your caregivers want you to be productive in your rehab and comfortable during your stay. In keeping with those goals, you will be receiving an SMI Cold Therapy Wrap to help ease post-operative pain and swelling that might keep you from getting back on track! Your SMI Cold Therapy Wrap is effective and simple-to-use, and you will be encouraged to apply it throughout your hospital stay and at home through the duration of your recovery.    When you are ready to go home  Be sure to take your SMI Cold Therapy Wrap and both sets of Gel Bags with you for continued comfort and use throughout your rehabilitation. If you don't already have them, ask your nurse or aide to retrieve your SMI Gel Bags from the patient freezer.    Home use precautions  Always follow your medical professional's application instructions upon discharge. Your SMI Cold Therapy Wrap and Gel Bags are designed to last for months following your surgery. Never heat the Gel Bags unless specified by your healthcare provider. Supervision is advised when using this product on children or geriatric patients. To avoid danger of suffocation, please keep the outer plastic packaging away from children & pets.    Cold Therapy Instructions  Place Gel Bags in a freezer set ¾ of the way to max temperature for at least (4) hours. For best results, lay the Gel Bags flat and [id number] in the freezer. Once frozen, slide Gel Bags into the gel pouch and secure your wrap to the affected area with the  straps.  Gel wraps that have been stored in a freezer for an extended period of time may require a (10) minute period of softening up in a room temperature environment before application.  The gel pouch acts as a protective barrier. NEVER place frozen bags directly onto skin, as this may cause frostbite injury.  The [facility] Cold Therapy Wrap is designed to be able to be worm while ambulating. The compression straps can be secured well enough so that the Wrap won't fall off while moving.  Wrap Application Videos can be viewed at [email or web address].  An additional protective barrier such as clothing, a washcloth, hand-towel or pillowcase may be used during prolonged treatment applications.  The Gel-Pouch and Wrap are both Latex-Free and the Gel Bag ingredients are non toxic.    [facility] Wrap care instructions  The [facility] Cold Therapy Wrap may be hand washed and hung to dry when needed.    [facility] re-order information  Additional [facility] body specific wraps and/or Gel Bags can be re-ordered from [email or web address] or call [email or web address] ([phone])

## (undated) DEVICE — INTENDED FOR TISSUE SEPARATION, AND OTHER PROCEDURES THAT REQUIRE A SHARP SURGICAL BLADE TO PUNCTURE OR CUT.: Brand: BARD-PARKER ® CARBON RIB-BACK BLADES

## (undated) DEVICE — PATIENT RETURN ELECTRODE, SINGLE-USE, CONTACT QUALITY MONITORING, ADULT, WITH 9FT CORD, FOR PATIENTS WEIGING OVER 33LBS. (15KG): Brand: MEGADYNE

## (undated) DEVICE — BLANKT WARM LOWR/BDY 100X120CM

## (undated) DEVICE — SUT VIC 2/0 CT2 27IN J269H

## (undated) DEVICE — 450 ML BOTTLE OF 0.05% CHLORHEXIDINE GLUCONATE IN 99.95% STERILE WATER FOR IRRIGATION, USP AND APPLICATOR.: Brand: IRRISEPT ANTIMICROBIAL WOUND LAVAGE

## (undated) DEVICE — ANTIBACTERIAL UNDYED BRAIDED (POLYGLACTIN 910), SYNTHETIC ABSORBABLE SUTURE: Brand: COATED VICRYL

## (undated) DEVICE — UNDERGLV SURG BIOGEL INDICAT PI SZ8 BLU

## (undated) DEVICE — SKN PREP SPNG STKS PVP PNT STR: Brand: MEDLINE INDUSTRIES, INC.

## (undated) DEVICE — INTENDED TO SUPPORT AND MAINTAIN THE POSITION OF AN ANESTHETIZED PATIENT DURING SURGERY: Brand: ERIN BEACH CHAIR FACE MASK

## (undated) DEVICE — SLNG ULTRSLING2 11IN SM

## (undated) DEVICE — SOL ISO/ALC RUB 70PCT 4OZ

## (undated) DEVICE — 3M™ IOBAN™ 2 ANTIMICROBIAL INCISE DRAPE 6651EZ: Brand: IOBAN™ 2

## (undated) DEVICE — STRYKER PERFORMANCE SERIES SAGITTAL BLADE: Brand: STRYKER PERFORMANCE SERIES

## (undated) DEVICE — SPNG GZ WOVN 4X4IN 12PLY 10/BX STRL

## (undated) DEVICE — TRAP FLD MINIVAC MEGADYNE 100ML

## (undated) DEVICE — PULLOVER TOGA, 2X LARGE: Brand: FLYTE, SURGICOOL

## (undated) DEVICE — PK MAJ SHLDR SPLT 10

## (undated) DEVICE — PENCL SMOKE/EVAC MEGADYNE TELESCP 10FT

## (undated) DEVICE — GLV SURG SENSICARE PI ORTHO SZ7.5 LF STRL